# Patient Record
Sex: FEMALE | Race: WHITE | NOT HISPANIC OR LATINO | Employment: FULL TIME | URBAN - METROPOLITAN AREA
[De-identification: names, ages, dates, MRNs, and addresses within clinical notes are randomized per-mention and may not be internally consistent; named-entity substitution may affect disease eponyms.]

---

## 2017-01-02 ENCOUNTER — RESULTS ENCOUNTER (OUTPATIENT)
Dept: SURGERY | Facility: CLINIC | Age: 47
End: 2017-01-02

## 2017-01-02 DIAGNOSIS — K80.00 GALLSTONES AND INFLAMMATION OF GALLBLADDER WITHOUT OBSTRUCTION: ICD-10-CM

## 2017-01-04 ENCOUNTER — OFFICE VISIT (OUTPATIENT)
Dept: SURGERY | Facility: CLINIC | Age: 47
End: 2017-01-04

## 2017-01-04 VITALS
DIASTOLIC BLOOD PRESSURE: 88 MMHG | HEIGHT: 67 IN | WEIGHT: 228 LBS | OXYGEN SATURATION: 98 % | HEART RATE: 75 BPM | SYSTOLIC BLOOD PRESSURE: 124 MMHG | BODY MASS INDEX: 35.79 KG/M2

## 2017-01-04 DIAGNOSIS — Z48.89 ENCOUNTER FOR POSTOPERATIVE WOUND CHECK: Primary | ICD-10-CM

## 2017-01-04 PROCEDURE — 99024 POSTOP FOLLOW-UP VISIT: CPT | Performed by: SPECIALIST

## 2017-01-04 NOTE — PROGRESS NOTES
Subjective   Karen Alarcon is a 46 y.o. female.     History of Present Illness   The patient is a very pleasant 46-year-old female who returns to our office 5 days after laparoscopic cholecystectomy with cholangiogram for cholelithiasis and chronic cholecystitis.  Fortunately the patient is doing reasonably well.  She does have what appears to be moderate dermatitis around all of her incision sites.  This may well have been associated with the material utilized to hold the Steri-Strips in place.  This appears to be a minor allergic reaction.  Otherwise the patient is doing well.  She is eating with no nausea and vomiting.  There is no fever and chills.      Review of Systems   Constitutional: Negative for activity change, appetite change, chills and fever.   HENT: Negative.    Eyes: Negative.    Respiratory: Negative for cough and wheezing.    Cardiovascular: Negative.    Gastrointestinal: Negative for abdominal distention, abdominal pain, nausea and vomiting.   Endocrine: Negative.    Genitourinary: Negative for difficulty urinating, dysuria and hematuria.   Musculoskeletal: Negative.    Skin: Negative.    Allergic/Immunologic: Negative.    Neurological: Negative.    Hematological: Negative.    Psychiatric/Behavioral: Negative.        Objective   Physical Exam   Constitutional: She is oriented to person, place, and time. She appears well-developed and well-nourished.   HENT:   Head: Normocephalic.   Eyes: No scleral icterus.   Neck: No JVD present.   Cardiovascular: Normal rate and regular rhythm.    Pulmonary/Chest: Effort normal and breath sounds normal. She has no wheezes. She has no rales.   Abdominal: Soft. She exhibits no distension. There is no tenderness.   Musculoskeletal: Normal range of motion. She exhibits no edema or deformity.   Lymphadenopathy:     She has no cervical adenopathy.   Neurological: She is alert and oriented to person, place, and time.   Skin: Skin is warm and dry. Rash noted. There is  erythema.   The areas of reaction around all 4 skin incisions consistent with dermatitis   Psychiatric: She has a normal mood and affect. Her behavior is normal.   Vitals reviewed.      Assessment/Plan     The patient is generally doing well except for the above issue.  I recommended to the patient that she start Benadryl for consistently.

## 2017-01-04 NOTE — MR AVS SNAPSHOT
Karen Alarcon   2017 1:00 PM   Office Visit    Dept Phone:  [phone]   Encounter #:  [id number]    Provider:  Lázaro Leija MD   Department:  [facility] GENERAL SURGERY                Your Full Care Plan              Today's Medication Changes          These changes are accurate as of: 17  1:32 PM.  If you have any questions, ask your nurse or doctor.               Stop taking medication(s)listed here:     HYDROcodone-acetaminophen 5-325 MG per tablet   Commonly known as:  NORCO                      Your Updated Medication List          This list is accurate as of: 17  1:32 PM.  Always use your most recent med list.                clobetasol 0.05 % ointment   Commonly known as:  TEMOVATE       DIVIGEL 1 MG/GM gel   Generic drug:  estradiol       ESTRACE VAGINAL 0.1 MG/GM vaginal cream   Generic drug:  estradiol       LamoTRIgine  MG tablet sustained-release 24 hour       nitrofurantoin (macrocrystal-monohydrate) 100 MG capsule   Commonly known as:  MACROBID       valACYclovir 1000 MG tablet   Commonly known as:  VALTREX       VIIBRYD 40 MG tablet tablet   Generic drug:  vilazodone               You Were Diagnosed With        Codes Comments    Encounter for postoperative wound check    -  Primary ICD-10-CM: Z48.89  ICD-9-CM: V58.49       Instructions     None    Patient Instructions History      Upcoming Appointments     Visit Type Date Time Department    POST-OP 2017  1:00 PM [facility]      [facility] [employer] Signup     [facility] allows you to send messages to your doctor, view your test results, renew your prescriptions, schedule appointments, and more. To sign up, go to [employer] and click on the Sign Up Now link in the New User? box. Enter your [employer] Activation Code exactly as it appears below along with the last four digits of your Social Security Number and your Date of Birth () to complete the sign-up  "process. If you do not sign up before the expiration date, you must request a new code.    [employer] Activation Code: [email or web address]  Expires: 1/11/2017  2:36 PM    If you have questions, you can email [email or web address] or call [phone] to talk to our [employer] staff. Remember, [employer] is NOT to be used for urgent needs. For medical emergencies, dial 911.               Other Info from Your Visit           Allergies     No Known Allergies      Vital Signs     Blood Pressure Pulse Height Weight Oxygen Saturation Body Mass Index    124/88 75 67\" (170.2 cm) 228 lb (103 kg) 98% 35.71 kg/m2    Smoking Status                   Never Smoker           Problems and Diagnoses Noted     Encounter for postoperative wound check    -  Primary        "

## 2017-09-11 DIAGNOSIS — N39.41 URGE INCONTINENCE: ICD-10-CM

## 2017-09-11 DIAGNOSIS — M25.50 MULTIPLE JOINT PAIN: ICD-10-CM

## 2017-09-11 DIAGNOSIS — E66.9 OBESITY, CLASS II, BMI 35-39.9: Primary | ICD-10-CM

## 2017-09-11 DIAGNOSIS — Z87.898 HISTORY OF SEIZURE: ICD-10-CM

## 2017-09-11 DIAGNOSIS — R53.82 CHRONIC FATIGUE: ICD-10-CM

## 2017-09-11 DIAGNOSIS — G47.33 OSA (OBSTRUCTIVE SLEEP APNEA): ICD-10-CM

## 2017-09-11 DIAGNOSIS — I10 ESSENTIAL HYPERTENSION: ICD-10-CM

## 2017-09-11 PROBLEM — E66.812 OBESITY, CLASS II, BMI 35-39.9: Status: ACTIVE | Noted: 2017-09-11

## 2017-09-11 PROBLEM — R06.83 SNORING: Status: ACTIVE | Noted: 2017-09-11

## 2017-09-11 PROBLEM — R12 HEARTBURN: Status: ACTIVE | Noted: 2017-09-11

## 2017-09-11 PROBLEM — D58.0 HEREDITARY SPHEROCYTOSIS (HCC): Status: ACTIVE | Noted: 2017-09-11

## 2017-09-11 PROBLEM — Z86.718 HISTORY OF DVT (DEEP VEIN THROMBOSIS): Status: ACTIVE | Noted: 2017-09-11

## 2017-09-11 PROBLEM — K64.9 HEMORRHOIDS: Status: ACTIVE | Noted: 2017-09-11

## 2017-09-11 PROBLEM — F32.A DEPRESSION: Status: ACTIVE | Noted: 2017-09-11

## 2017-09-12 ENCOUNTER — LAB (OUTPATIENT)
Dept: LAB | Facility: HOSPITAL | Age: 47
End: 2017-09-12

## 2017-09-12 DIAGNOSIS — M25.50 MULTIPLE JOINT PAIN: ICD-10-CM

## 2017-09-12 DIAGNOSIS — E66.9 OBESITY, CLASS II, BMI 35-39.9: ICD-10-CM

## 2017-09-12 DIAGNOSIS — R53.82 CHRONIC FATIGUE: ICD-10-CM

## 2017-09-12 DIAGNOSIS — Z87.898 HISTORY OF SEIZURE: ICD-10-CM

## 2017-09-12 DIAGNOSIS — I10 ESSENTIAL HYPERTENSION: ICD-10-CM

## 2017-09-12 DIAGNOSIS — G47.33 OSA (OBSTRUCTIVE SLEEP APNEA): ICD-10-CM

## 2017-09-12 DIAGNOSIS — N39.41 URGE INCONTINENCE: ICD-10-CM

## 2017-09-12 LAB
ALBUMIN SERPL-MCNC: 3.6 G/DL (ref 3.5–5.2)
ALBUMIN/GLOB SERPL: 0.8 G/DL
ALP SERPL-CCNC: 103 U/L (ref 39–117)
ALT SERPL W P-5'-P-CCNC: 12 U/L (ref 1–33)
ANION GAP SERPL CALCULATED.3IONS-SCNC: 15.8 MMOL/L
AST SERPL-CCNC: 17 U/L (ref 1–32)
BASOPHILS # BLD AUTO: 0.07 10*3/MM3 (ref 0–0.2)
BASOPHILS NFR BLD AUTO: 0.4 % (ref 0–1.5)
BILIRUB SERPL-MCNC: 0.8 MG/DL (ref 0.1–1.2)
BUN BLD-MCNC: 9 MG/DL (ref 6–20)
BUN/CREAT SERPL: 9.4 (ref 7–25)
CALCIUM SPEC-SCNC: 9.4 MG/DL (ref 8.6–10.5)
CHLORIDE SERPL-SCNC: 100 MMOL/L (ref 98–107)
CHOLEST SERPL-MCNC: 190 MG/DL (ref 0–200)
CO2 SERPL-SCNC: 25.2 MMOL/L (ref 22–29)
CREAT BLD-MCNC: 0.96 MG/DL (ref 0.57–1)
DEPRECATED RDW RBC AUTO: 43.5 FL (ref 37–54)
EOSINOPHIL # BLD AUTO: 0.53 10*3/MM3 (ref 0–0.7)
EOSINOPHIL NFR BLD AUTO: 2.9 % (ref 0.3–6.2)
ERYTHROCYTE [DISTWIDTH] IN BLOOD BY AUTOMATED COUNT: 12.6 % (ref 11.7–13)
GFR SERPL CREATININE-BSD FRML MDRD: 63 ML/MIN/1.73
GLOBULIN UR ELPH-MCNC: 4.4 GM/DL
GLUCOSE BLD-MCNC: 84 MG/DL (ref 65–99)
HBA1C MFR BLD: 4.56 % (ref 4.8–5.6)
HCT VFR BLD AUTO: 44.8 % (ref 35.6–45.5)
HDLC SERPL-MCNC: 53 MG/DL (ref 40–60)
HGB BLD-MCNC: 15.5 G/DL (ref 11.9–15.5)
IMM GRANULOCYTES # BLD: 0.1 10*3/MM3 (ref 0–0.03)
IMM GRANULOCYTES NFR BLD: 0.6 % (ref 0–0.5)
LDLC SERPL CALC-MCNC: 120 MG/DL (ref 0–100)
LDLC/HDLC SERPL: 2.27 {RATIO}
LYMPHOCYTES # BLD AUTO: 5.74 10*3/MM3 (ref 0.9–4.8)
LYMPHOCYTES NFR BLD AUTO: 31.9 % (ref 19.6–45.3)
MCH RBC QN AUTO: 33 PG (ref 26.9–32)
MCHC RBC AUTO-ENTMCNC: 34.6 G/DL (ref 32.4–36.3)
MCV RBC AUTO: 95.3 FL (ref 80.5–98.2)
MONOCYTES # BLD AUTO: 2.67 10*3/MM3 (ref 0.2–1.2)
MONOCYTES NFR BLD AUTO: 14.8 % (ref 5–12)
NEUTROPHILS # BLD AUTO: 8.9 10*3/MM3 (ref 1.9–8.1)
NEUTROPHILS NFR BLD AUTO: 49.4 % (ref 42.7–76)
NRBC BLD MANUAL-RTO: 0 /100 WBC (ref 0–0)
PLATELET # BLD AUTO: 502 10*3/MM3 (ref 140–500)
PMV BLD AUTO: 11.1 FL (ref 6–12)
POTASSIUM BLD-SCNC: 4.4 MMOL/L (ref 3.5–5.2)
PROT SERPL-MCNC: 8 G/DL (ref 6–8.5)
RBC # BLD AUTO: 4.7 10*6/MM3 (ref 3.9–5.2)
SODIUM BLD-SCNC: 141 MMOL/L (ref 136–145)
TRIGL SERPL-MCNC: 84 MG/DL (ref 0–150)
TSH SERPL DL<=0.05 MIU/L-ACNC: 2.63 MIU/ML (ref 0.27–4.2)
VLDLC SERPL-MCNC: 16.8 MG/DL (ref 5–40)
WBC NRBC COR # BLD: 18.01 10*3/MM3 (ref 4.5–10.7)

## 2017-09-12 PROCEDURE — 85025 COMPLETE CBC W/AUTO DIFF WBC: CPT

## 2017-09-12 PROCEDURE — 84443 ASSAY THYROID STIM HORMONE: CPT

## 2017-09-12 PROCEDURE — 83036 HEMOGLOBIN GLYCOSYLATED A1C: CPT

## 2017-09-12 PROCEDURE — 80061 LIPID PANEL: CPT

## 2017-09-12 PROCEDURE — 80053 COMPREHEN METABOLIC PANEL: CPT

## 2017-09-12 PROCEDURE — 36415 COLL VENOUS BLD VENIPUNCTURE: CPT

## 2017-09-18 ENCOUNTER — CONSULT (OUTPATIENT)
Dept: BARIATRICS/WEIGHT MGMT | Facility: CLINIC | Age: 47
End: 2017-09-18

## 2017-09-18 VITALS
SYSTOLIC BLOOD PRESSURE: 133 MMHG | HEART RATE: 66 BPM | HEIGHT: 67 IN | TEMPERATURE: 97.7 F | RESPIRATION RATE: 16 BRPM | BODY MASS INDEX: 38.92 KG/M2 | WEIGHT: 248 LBS | DIASTOLIC BLOOD PRESSURE: 82 MMHG

## 2017-09-18 DIAGNOSIS — R12 HEARTBURN: ICD-10-CM

## 2017-09-18 DIAGNOSIS — M25.50 MULTIPLE JOINT PAIN: ICD-10-CM

## 2017-09-18 DIAGNOSIS — E66.9 OBESITY, CLASS II, BMI 35-39.9: Primary | ICD-10-CM

## 2017-09-18 DIAGNOSIS — N39.41 URGE INCONTINENCE: ICD-10-CM

## 2017-09-18 DIAGNOSIS — G47.33 OSA (OBSTRUCTIVE SLEEP APNEA): ICD-10-CM

## 2017-09-18 DIAGNOSIS — Z87.898 HISTORY OF SEIZURE: ICD-10-CM

## 2017-09-18 DIAGNOSIS — R53.82 CHRONIC FATIGUE: ICD-10-CM

## 2017-09-18 DIAGNOSIS — Z86.718 HISTORY OF DVT (DEEP VEIN THROMBOSIS): ICD-10-CM

## 2017-09-18 DIAGNOSIS — I10 ESSENTIAL HYPERTENSION: ICD-10-CM

## 2017-09-18 PROCEDURE — 99205 OFFICE O/P NEW HI 60 MIN: CPT | Performed by: NURSE PRACTITIONER

## 2017-09-18 NOTE — PROGRESS NOTES
[facility] BARIATRIC SURGERY  [address]  [facility] [address] [phone]  [address]  [facility] [address] [phone]  Dept: [phone]  9/18/2017      Karen Alarcon.  [id number]  [id number]  1970  female      Chief Complaint of weight gain; unable to maintain weight loss    History of Present Illness:   Karen is a 47 y.o. female who presents today for evaluation, education and consultation regarding weight loss surgery. The patient is interested in the sleeve gastrectomy or lapband.      Diet History:Karen has been overweight for at least 23 years, has been 35 pounds or more overweight for at least 12 years, has been 100 pounds or more overweight for 1 or more years and started dieting at age 32.  The most weight Karen lost was 20 pounds on Weight Watchers and maintained the weight loss for 1 month. Karen describes her eating habits as snacking without portion control, eating a lot of sweets, drinking up to 6 caffeine-containing drinks per day, drinking around 3 sodas per week.. Karen Alarcon has tried Weight Watchers, Dietician monitored and reduced calorie among others with success of losing up to 20 pounds, but in each instance regained the weight.    See dietician documentation for complete history.    Bariatric Surgery Evaluation: The patient is being seen for an initial visit for bariatric surgery evaluation.     Bariatric Co-morbidities:  sleep apnea, hypertension, back pain, knee pain, depression, mental health disease and urinary stress incontinence    Patient Active Problem List   Diagnosis   • Obesity, Class II, BMI 35-39.9   • Chronic fatigue   • Essential hypertension   • DESIREE (obstructive sleep apnea)   • Snoring   • Heartburn   • Hemorrhoids   • Urge incontinence   • Multiple joint pain   • History of seizure   • Depression   • Hereditary spherocytosis   • History of DVT (deep vein thrombosis)       Past  Medical History:   Diagnosis Date   • Abdominal pain    • Anxiety and depression    • Blood disorder     hereditary spherocytosis   • Bowel habit changes    • Daytime sleepiness    • Hereditary spherocytosis 9/11/2017   • Hypertension    • Itching    • Joint pain    • Seizure disorder     spherocytosis   • Sleep apnea    • Stress incontinence        Past Surgical History:   Procedure Laterality Date   • CHOLECYSTECTOMY WITH INTRAOPERATIVE CHOLANGIOGRAM N/A 12/29/2016    Procedure: CHOLECYSTECTOMY LAPAROSCOPIC INTRAOPERATIVE CHOLANGIOGRAM;  Surgeon: Lázaro Leija MD;  Location: [facility] OR;  Service:    • HYSTERECTOMY  2008    partial   • KNEE ACL RECONSTRUCTION Bilateral 2006, 2007   • SINUS SURGERY  1975   • SPLENECTOMY  1975   • TONSILLECTOMY  1978       No Known Allergies      Current Outpatient Prescriptions:   •  LamoTRIgine  MG tablet sustained-release 24 hour, Take 300 mg by mouth Every Night., Disp: , Rfl:   •  nitrofurantoin, macrocrystal-monohydrate, (MACROBID) 100 MG capsule, Take 100 mg by mouth Every Night., Disp: , Rfl:   •  valACYclovir (VALTREX) 1000 MG tablet, Take 1,000 mg by mouth Every Night., Disp: , Rfl:   •  vilazodone (VIIBRYD) 40 MG tablet tablet, Take 40 mg by mouth Every Night., Disp: , Rfl:   •  ESTRACE VAGINAL 0.1 MG/GM vaginal cream, Insert  into the vagina Daily., Disp: , Rfl:     Social History     Social History   • Marital status:      Spouse name: Ricki Alarcon   • Number of children: 2   • Years of education: N/A     Occupational History   • Teacher      Social History Main Topics   • Smoking status: Never Smoker   • Smokeless tobacco: Never Used   • Alcohol use No   • Drug use: No   • Sexual activity: Defer     Other Topics Concern   • Not on file     Social History Narrative       Family History   Problem Relation Age of Onset   • Bleeding Disorder Mother    • Cancer Father          Review of Systems:  Review of Systems   Constitutional: Positive for fatigue.    HENT: Negative.    Respiratory: Negative.    Cardiovascular: Negative.    Gastrointestinal: Negative.    Endocrine: Negative.    Genitourinary: Negative.    Musculoskeletal: Positive for back pain.   Skin: Negative.    Neurological: Negative.    Psychiatric/Behavioral: Negative.        Physical Exam:  Vital Signs:      There is no height or weight on file to calculate BMI.                Physical Exam   Constitutional: She is oriented to person, place, and time. She appears well-developed and well-nourished.   HENT:   Head: Normocephalic and atraumatic.   Eyes: Pupils are equal, round, and reactive to light.   Neck: Normal range of motion.   Cardiovascular: Normal rate, regular rhythm and normal heart sounds.    Pulmonary/Chest: Effort normal and breath sounds normal. No respiratory distress. She has no wheezes.   Abdominal: Soft. Bowel sounds are normal. She exhibits no distension. There is no tenderness.   Musculoskeletal: Normal range of motion. She exhibits no edema or deformity.   Neurological: She is alert and oriented to person, place, and time.   Skin: Skin is warm and dry.   Psychiatric: She has a normal mood and affect. Her behavior is normal.   Nursing note and vitals reviewed.         Assessment:         Karen Alarcon is a 47 y.o. year old female with medically complicated severe obesity.  , There is no height or weight on file to calculate BMI. and weight related problems including sleep apnea, hypertension, back pain, knee pain, depression, mental health disease and urinary stress incontinence.    I explained in detail the procedures that we are performing.  All of those procedures can be performed laparoscopically but there is a chance to convert to open if any technical challenges or complications do occur.  Bariatric surgery is not cosmetic surgery but rather a tool to help a patient make a life-long commitment lifestyle changes including diet, exercise, behavior changes, and taking supplemental  vitamins and minerals.    Due to the patient's BMI and co-morbidities they are at a high risk for surgery and will obtain the following:  The patient has been advised that a letter of medical support and a history and physical must be obtained from her primary care physician. A psychological evaluation will be arranged for this patient. CBC, CMP, FLP, TSH and HgbA1C will be drawn. Karen ISAAC Dorian will obtain a pre-operative CXR and EKG.     Karen Alarcon will be set up for a pre-operative diagnostic esophagogastroduodenoscopy with biopsy for evaluation. The risks and benefits of the procedure were discussed with the patient in detail and all questions were answered.  Possibility of perforation, bleeding, aspiration, anoxic brain injury, respiratory and/or cardiac arrest and death were discussed.   She received handouts regarding, all questions were answered and informed consent was obtained.     The risks, benefits, alternatives, and potential complications of all of the procedures were explained in detail including, but not limited to death, anesthesia and medication adverse effect/DVT, pulmonary embolism, trocar site/incisional hernia, wound infection, abdominal infection, bleeding, failure to lose weight or gain weight and change in body image, metabolic complications with calcium, thiamine, vitamin B12, folate, iron, and anemia.    The patient was advised to start a high protein, low fat and low carbohydrate diet. The patient was given individualized information by our dietician along with general group information and handouts.     The patient was given information regarding the RAYMOND educational video. RAYMOND is an internet based educational video which explains the surgical procedure and answers basic questions regarding the procedure. The patient was provided with instructions and a password to watch the video.    The patient was encouraged to start routine exercise including but not limited to 150 minutes  per week. The patient received a resistance band along with a handout of exercises.     The consultation plan was reviewed with the patient.    The patient understands the surgical procedures and the different surgical options that are available.  She understands the lifestyle changes that would be required after surgery and has agreed to participate in a pre-operative and postoperative weight management program.  She also expressed understanding of possible risks, had several questions answered and desires to proceed.    I think she is a good candidate for this surgery, and is interested in a sleeve gastrectomy or lapband.    Encounter Diagnoses   Name Primary?   • Obesity, Class II, BMI 35-39.9 Yes   • Essential hypertension    • DESIREE (obstructive sleep apnea)    • Heartburn    • Multiple joint pain    • Chronic fatigue    • Urge incontinence    • History of seizure    • History of DVT (deep vein thrombosis)        Plan:    Patient will have evaluations and follow up with bariatric dieticians and a psychologist before undergoing a multidisciplinary review of her candidacy.  We also discussed the weight loss requirement and rationale, and other program requirements.      Candis Chan, TERRY  9/18/2017

## 2017-09-19 NOTE — PROGRESS NOTES
"Bariatric Nutrition Counseling Interview    Patient Name:  Karen Alarcon  YOB: 1970  Age:  47 y.o.  Sex:  female  MRN: [id number]  Date:  09/19/17    Procedure Considering:  Undecided    Last Documented Height:    Ht Readings from Last 1 Encounters:   09/18/17 67\" (170.2 cm)     Last Documented Weight:   Wt Readings from Last 1 Encounters:   09/18/17 248 lb (112 kg)      Body mass index is 38.84 kg/(m^2).    Highest Weight:  243 lb.  Goal Weight: 160 lb.    History:  Past Medical History:   Diagnosis Date   • Abdominal pain    • Anxiety and depression    • Blood disorder     hereditary spherocytosis   • Bowel habit changes    • Daytime sleepiness    • Hereditary spherocytosis 9/11/2017   • Hypertension    • Itching    • Joint pain    • Seizure disorder     spherocytosis   • Sleep apnea    • Stress incontinence      Past Surgical History:   Procedure Laterality Date   • CHOLECYSTECTOMY WITH INTRAOPERATIVE CHOLANGIOGRAM N/A 12/29/2016    Procedure: CHOLECYSTECTOMY LAPAROSCOPIC INTRAOPERATIVE CHOLANGIOGRAM;  Surgeon: Lázaro Leija MD;  Location: [facility];  Service:    • HYSTERECTOMY  2008    partial   • KNEE ACL RECONSTRUCTION Bilateral 2006, 2007   • SINUS SURGERY  1975   • SPLENECTOMY  1975   • TONSILLECTOMY  1978     Family History   Problem Relation Age of Onset   • Bleeding Disorder Mother    • Cancer Father      Social History     Social History   • Marital status:      Spouse name: Ricki Alarcon   • Number of children: 2   • Years of education: N/A     Occupational History   • Teacher      Social History Main Topics   • Smoking status: Never Smoker   • Smokeless tobacco: Never Used   • Alcohol use No   • Drug use: No   • Sexual activity: Defer     Other Topics Concern   • None     Social History Narrative     Additional Health Issues to Consider:  Depression, epilepsy, Sleep Apnea,    Weight History:  Weight gain as a result of an event or condition. Altagracia Gained weight " following knee surgery, with additional weight gain over the past several years.    Previous Weight Loss Efforts:  Weight Watchers  Most Successful Weight Loss Effort:  Weight Watchers    Eating Habits: Eat out of boredom, Snacker  Eat three meals on most days?  Yes  Worst eating habit?  Snacking on high calorie foods    How often do you eat fast food? daily    Do you exercise regularly? (at least 3 times each week)  No    Occupation:  Teacher    Personal Goal After Procedure:  Karen wants to move without pain and improve her stamina.   Personal Support:      Assessment:  We reviewed program requirements for weight loss success following surgery.We discussed personal habits and lifestyle behaviors that may influence diet efforts. Program materials, including a calorie reduced diet were provided, discussed and recommended as the regimen to follow for pre and post diet planning. Karen demonstrated a good comprehension of diet requirements as well as a commitment to work on personal challenges. She will make a good candidate for weight loss surgery    Electronically signed by:  Melita Caldwell RD  09/19/17 5:51 PM

## 2017-12-20 ENCOUNTER — HOSPITAL ENCOUNTER (OUTPATIENT)
Dept: GENERAL RADIOLOGY | Facility: HOSPITAL | Age: 47
Discharge: HOME | End: 2017-12-20

## 2017-12-20 ENCOUNTER — LAB REQUISITION (OUTPATIENT)
Dept: LAB | Facility: HOSPITAL | Age: 47
End: 2017-12-20

## 2017-12-20 ENCOUNTER — OUTSIDE FACILITY SERVICE (OUTPATIENT)
Dept: BARIATRICS/WEIGHT MGMT | Facility: CLINIC | Age: 47
End: 2017-12-20

## 2017-12-20 ENCOUNTER — HOSPITAL ENCOUNTER (OUTPATIENT)
Dept: CARDIOLOGY | Facility: HOSPITAL | Age: 47
Discharge: HOME | End: 2017-12-20
Admitting: NURSE PRACTITIONER

## 2017-12-20 DIAGNOSIS — M25.50 MULTIPLE JOINT PAIN: ICD-10-CM

## 2017-12-20 DIAGNOSIS — E66.9 OBESITY, CLASS II, BMI 35-39.9: ICD-10-CM

## 2017-12-20 DIAGNOSIS — N39.41 URGE INCONTINENCE: ICD-10-CM

## 2017-12-20 DIAGNOSIS — I10 ESSENTIAL HYPERTENSION: ICD-10-CM

## 2017-12-20 DIAGNOSIS — R53.82 CHRONIC FATIGUE: ICD-10-CM

## 2017-12-20 DIAGNOSIS — Z87.898 HISTORY OF SEIZURE: ICD-10-CM

## 2017-12-20 DIAGNOSIS — G47.33 OSA (OBSTRUCTIVE SLEEP APNEA): ICD-10-CM

## 2017-12-20 DIAGNOSIS — R12 HEARTBURN: ICD-10-CM

## 2017-12-20 DIAGNOSIS — Z86.718 HISTORY OF DVT (DEEP VEIN THROMBOSIS): ICD-10-CM

## 2017-12-20 PROCEDURE — 93005 ELECTROCARDIOGRAM TRACING: CPT | Performed by: NURSE PRACTITIONER

## 2017-12-20 PROCEDURE — 93010 ELECTROCARDIOGRAM REPORT: CPT | Performed by: INTERNAL MEDICINE

## 2017-12-20 PROCEDURE — 71020 HC CHEST PA AND LATERAL: CPT

## 2017-12-20 PROCEDURE — 43239 EGD BIOPSY SINGLE/MULTIPLE: CPT | Performed by: SURGERY

## 2017-12-20 PROCEDURE — 87081 CULTURE SCREEN ONLY: CPT | Performed by: SURGERY

## 2017-12-21 LAB — UREASE TISS QL: NEGATIVE

## 2017-12-26 ENCOUNTER — TELEPHONE (OUTPATIENT)
Dept: BARIATRICS/WEIGHT MGMT | Facility: CLINIC | Age: 47
End: 2017-12-26

## 2017-12-26 NOTE — TELEPHONE ENCOUNTER
----- Message from Mick Posadas Jr., MD sent at 12/26/2017  6:58 AM EST -----  Please call the patient regarding her abnormal result and if abnormal treat per protocol. Make sure she has follow up appointment.

## 2018-01-02 ENCOUNTER — APPOINTMENT (OUTPATIENT)
Dept: WOMENS IMAGING | Facility: HOSPITAL | Age: 48
End: 2018-01-02

## 2018-01-02 PROCEDURE — 77063 BREAST TOMOSYNTHESIS BI: CPT | Performed by: RADIOLOGY

## 2018-01-02 PROCEDURE — 77067 SCR MAMMO BI INCL CAD: CPT | Performed by: RADIOLOGY

## 2018-04-24 ENCOUNTER — PREP FOR SURGERY (OUTPATIENT)
Dept: OTHER | Facility: HOSPITAL | Age: 48
End: 2018-04-24

## 2018-04-24 DIAGNOSIS — E66.01 SEVERE OBESITY (BMI 35.0-39.9): Primary | ICD-10-CM

## 2018-04-24 RX ORDER — SCOLOPAMINE TRANSDERMAL SYSTEM 1 MG/1
1 PATCH, EXTENDED RELEASE TRANSDERMAL ONCE
Status: CANCELLED | OUTPATIENT
Start: 2018-06-11 | End: 2018-06-11

## 2018-04-24 RX ORDER — SODIUM CHLORIDE 0.9 % (FLUSH) 0.9 %
1-10 SYRINGE (ML) INJECTION AS NEEDED
Status: CANCELLED | OUTPATIENT
Start: 2018-06-11

## 2018-04-24 RX ORDER — ACETAMINOPHEN 160 MG/5ML
975 SOLUTION ORAL ONCE
Status: CANCELLED | OUTPATIENT
Start: 2018-06-11 | End: 2018-06-11

## 2018-04-24 RX ORDER — CEFAZOLIN SODIUM IN 0.9 % NACL 3 G/100 ML
3 INTRAVENOUS SOLUTION, PIGGYBACK (ML) INTRAVENOUS
Status: CANCELLED | OUTPATIENT
Start: 2018-06-11

## 2018-04-24 RX ORDER — CHLORHEXIDINE GLUCONATE 0.12 MG/ML
15 RINSE ORAL SEE ADMIN INSTRUCTIONS
Status: CANCELLED | OUTPATIENT
Start: 2018-06-11

## 2018-04-24 RX ORDER — METOCLOPRAMIDE HYDROCHLORIDE 5 MG/ML
10 INJECTION INTRAMUSCULAR; INTRAVENOUS ONCE
Status: CANCELLED | OUTPATIENT
Start: 2018-06-11 | End: 2018-06-11

## 2018-04-24 RX ORDER — SODIUM CHLORIDE, SODIUM LACTATE, POTASSIUM CHLORIDE, CALCIUM CHLORIDE 600; 310; 30; 20 MG/100ML; MG/100ML; MG/100ML; MG/100ML
100 INJECTION, SOLUTION INTRAVENOUS CONTINUOUS
Status: CANCELLED | OUTPATIENT
Start: 2018-06-11

## 2018-04-24 RX ORDER — FAMOTIDINE 10 MG/ML
20 INJECTION, SOLUTION INTRAVENOUS ONCE
Status: CANCELLED | OUTPATIENT
Start: 2018-06-11 | End: 2018-06-11

## 2018-05-16 PROBLEM — E66.01 SEVERE OBESITY (BMI 35.0-39.9): Status: ACTIVE | Noted: 2018-05-16

## 2018-05-17 ENCOUNTER — CONSULT (OUTPATIENT)
Dept: BARIATRICS/WEIGHT MGMT | Facility: CLINIC | Age: 48
End: 2018-05-17

## 2018-05-17 VITALS
DIASTOLIC BLOOD PRESSURE: 88 MMHG | BODY MASS INDEX: 37.98 KG/M2 | RESPIRATION RATE: 16 BRPM | HEART RATE: 77 BPM | HEIGHT: 67 IN | SYSTOLIC BLOOD PRESSURE: 133 MMHG | WEIGHT: 242 LBS

## 2018-05-17 DIAGNOSIS — M25.50 MULTIPLE JOINT PAIN: ICD-10-CM

## 2018-05-17 DIAGNOSIS — I10 ESSENTIAL HYPERTENSION: ICD-10-CM

## 2018-05-17 DIAGNOSIS — Z86.718 HISTORY OF DVT (DEEP VEIN THROMBOSIS): ICD-10-CM

## 2018-05-17 DIAGNOSIS — F32.A DEPRESSION, UNSPECIFIED DEPRESSION TYPE: ICD-10-CM

## 2018-05-17 DIAGNOSIS — G47.33 OSA (OBSTRUCTIVE SLEEP APNEA): ICD-10-CM

## 2018-05-17 DIAGNOSIS — R12 HEARTBURN: ICD-10-CM

## 2018-05-17 DIAGNOSIS — Z90.81 HISTORY OF SPLENECTOMY: ICD-10-CM

## 2018-05-17 DIAGNOSIS — N39.41 URGE INCONTINENCE: ICD-10-CM

## 2018-05-17 DIAGNOSIS — D58.0 HEREDITARY SPHEROCYTOSIS (HCC): ICD-10-CM

## 2018-05-17 DIAGNOSIS — R53.82 CHRONIC FATIGUE: ICD-10-CM

## 2018-05-17 DIAGNOSIS — E66.9 OBESITY, CLASS II, BMI 35-39.9: Primary | ICD-10-CM

## 2018-05-17 PROBLEM — E66.01 SEVERE OBESITY (BMI 35.0-39.9): Status: RESOLVED | Noted: 2018-05-16 | Resolved: 2018-05-17

## 2018-05-17 PROCEDURE — 99215 OFFICE O/P EST HI 40 MIN: CPT | Performed by: SURGERY

## 2018-05-17 RX ORDER — FOLIC ACID 1 MG/1
1 TABLET ORAL DAILY
Qty: 40 TABLET | Refills: 0 | Status: SHIPPED | OUTPATIENT
Start: 2018-05-17 | End: 2018-07-09

## 2018-05-17 NOTE — H&P
Bariatric Consult:  Referred by Anibal Callejas MD    Karen Alarcon is here today for consult on Consult (Consult GS)      History of Present Illness:     Karen Alarcon is a 47 y.o. female with morbid obesity with co-morbidities including sleep apnea, hypertension, osteoarthritis, previous DVT and depression who presents for surgical consultation for the above procedure. Karen has completed the initial intake visit and has been examined by our nurse practitioner, dietician, psychologist and underwent the extensive educational teaching process under the guidance of our bariatric coordinator and myself. Karen also has seen the educational video RAYMOND on the surgical procedure if available. Karen attended today more educational teaching from our bariatric coordinator and myself. Karen has had an extensive medical workup including a visit with their primary care physician, EKG, chest radiograph, blood work, EGD or UGI and possibly further testing. These have been reviewed by me and discussed with the patient. Karen is now ready to proceed with surgery. Karen presently denies nausea, vomiting, fever, chills, chest pain, shortness of air, melena, hematochezia, hemetemesis, dysuria, frequency, hematuria, jaundice or abdominal pain.       Past Medical History:   Diagnosis Date   • Abdominal pain    • Anxiety and depression    • Blood disorder     hereditary spherocytosis   • Bowel habit changes    • Daytime sleepiness    • Hereditary spherocytosis 9/11/2017   • Hypertension    • Itching    • Joint pain    • Seizure disorder     spherocytosis   • Sleep apnea    • Stress incontinence        Encounter Diagnoses   Name Primary?   • Obesity, Class II, BMI 35-39.9 Yes   • Chronic fatigue    • Essential hypertension    • DESIREE (obstructive sleep apnea)    • Heartburn    • Urge incontinence    • Multiple joint pain    • Depression, unspecified depression type    • History of DVT (deep vein thrombosis)    • Hereditary  spherocytosis    • History of splenectomy        Past Surgical History:   Procedure Laterality Date   • CHOLECYSTECTOMY WITH INTRAOPERATIVE CHOLANGIOGRAM N/A 12/29/2016    Procedure: CHOLECYSTECTOMY LAPAROSCOPIC INTRAOPERATIVE CHOLANGIOGRAM;  Surgeon: Lázaro Leija MD;  Location: [facility] OR;  Service:    • HYSTERECTOMY  2008    partial   • KNEE ACL RECONSTRUCTION Bilateral 2006, 2007   • SINUS SURGERY  1975   • SPLENECTOMY  1975   • TONSILLECTOMY  1978       Patient Active Problem List   Diagnosis   • Obesity, Class II, BMI 35-39.9   • Chronic fatigue   • Essential hypertension   • DESIREE (obstructive sleep apnea)   • Snoring   • Heartburn   • Hemorrhoids   • Urge incontinence   • Multiple joint pain   • History of seizure   • Depression   • Hereditary spherocytosis   • History of DVT (deep vein thrombosis)   • History of splenectomy       No Known Allergies      Current Outpatient Prescriptions:   •  LamoTRIgine  MG tablet sustained-release 24 hour, Take 300 mg by mouth Every Night., Disp: , Rfl:   •  nitrofurantoin, macrocrystal-monohydrate, (MACROBID) 100 MG capsule, Take 100 mg by mouth Every Night., Disp: , Rfl:   •  valACYclovir (VALTREX) 1000 MG tablet, Take 1,000 mg by mouth Every Night., Disp: , Rfl:   •  vilazodone (VIIBRYD) 40 MG tablet tablet, Take 40 mg by mouth Every Night., Disp: , Rfl:   •  folic acid (FOLVITE) 1 MG tablet, Take 1 tablet by mouth Daily. Begin two weeks prior to surgery, Disp: 40 tablet, Rfl: 0    Social History     Social History   • Marital status:      Spouse name: Ricki Alarcon   • Number of children: 2   • Years of education: N/A     Occupational History   • Teacher      Social History Main Topics   • Smoking status: Never Smoker   • Smokeless tobacco: Never Used   • Alcohol use No   • Drug use: No   • Sexual activity: Defer     Other Topics Concern   • Not on file     Social History Narrative   • No narrative on file       Family History   Problem Relation Age  of Onset   • Bleeding Disorder Mother    • Cancer Father        Review of Systems:  Review of Systems   Constitutional: Positive for fatigue.   Gastrointestinal: Positive for diarrhea and nausea.   Musculoskeletal: Positive for arthralgias.   All other systems reviewed and are negative.        Physical Exam:    Vital Signs:  Weight: 110 kg (242 lb)   Body mass index is 37.89 kg/m².      Heart Rate: 77   BP: 133/88       Physical Exam   Constitutional: She is oriented to person, place, and time. She appears well-nourished.   HENT:   Head: Normocephalic and atraumatic.   Mouth/Throat: Oropharynx is clear and moist.   Eyes: Conjunctivae and EOM are normal. Pupils are equal, round, and reactive to light. No scleral icterus.   Neck: Normal range of motion. Neck supple. No thyromegaly present.   Cardiovascular: Normal rate and regular rhythm.    Pulmonary/Chest: Effort normal and breath sounds normal.   Abdominal: Soft. Bowel sounds are normal. She exhibits no distension and no mass. There is no tenderness. There is no rebound and no guarding. No hernia.   Musculoskeletal: Normal range of motion.   Lymphadenopathy:     She has no cervical adenopathy.   Neurological: She is alert and oriented to person, place, and time. No cranial nerve deficit. Coordination normal.   Skin: Skin is warm and dry. No erythema.   Psychiatric: She has a normal mood and affect. Her behavior is normal.   Vitals reviewed.        Assessment:    Karen Alarcon is a 47 y.o. year old female with medically complicated severe obesity with a BMI of Body mass index is 37.89 kg/m². and multiple co-morbidities listed in the encounter diagnosis.    I think she is an appropriate candidate for this surgery, and is ready to proceed.      Plan/Discussion/Summary:  No hiatal hernia per me.  No PPI.  Helical back to pylori negative.  Patient status post open splenectomy secondary to hereditary spherocytosis.    The patient has returned to the office for a  surgical consultation and has requested to proceed with a laparoscopic gastric sleeve.  I have had the opportunity to obtain a history, examine the patient and review the patient's chart.    The patient understands that surgery is a tool and that weight loss is not guaranteed but only seen in the context of appropriate use, regular follow up, exercise and making appropriate food choices.     I personally discussed the potential complications of the laparoscopic gastric sleeve with this patient.  The patient is well aware of potential complications of the surgery that include but not limited to bleeding, infections, deep vein thrombosis, pulmonary embolism, pulmonary complications such as pneumonia, cardiac event, hernias, small bowel obstruction, damage to the spleen or other organs, bowel injury, disfiguring scars, failure to lose weight, need for additional surgery, conversion to an open procedure and death.  The patient is also aware of complications which apply in particular to the gastric sleeve and can include but not limited to the leakage of gastric contents at the staple line, the development of an intra-abdominal abscess, gastroesophageal reflux disease, Capps's esophagus, ulcers, vitamin/mineral deficiencies, strictures, and the possibility of converting this procedure to a Umu-en-Y gastric bypass. The patient also understands the possibility of requiring an acid reducer medication for the rest of their life.    The risks, benefits, potential complications and alternative therapies were discussed at great length as outlined in our extensive consent forms, online consent and educational teaching processes.    The patient has confirmed the participation in the programs extensive educational activities.    All questions and concerns were answered to patient's satisfaction.  The patient now wishes to proceed with surgery.    Patient has declined the pre-operative insertion of an IVC filter.     The patient  has agreed to a postoperative course of anitcoagulant therapy.        I instructed patient to start on a H2 blocker or proton pump inhibitor if not already on one of these medications.    I explained in detail the procedures that we perform.  All of these procedures have a chance to convert to open if any technical challenges or complications do occur.  Bariatric surgery is not cosmetic surgery but rather a tool to help a patient make a life-long commitment lifestyle change including diet, exercise, behavior changes, and taking supplemental vitamins and minerals.    Problems after surgery may require more operations to correct them.    The risks, benefits, alternatives, and potential complications of all of the procedures were explained in detail including, but not limited to death, anesthesia and medication adverse effect, deep venous thrombosis, pulmonary embolism, trocar site/incisional hernia, wound infection, abdominal infection, bleeding, failure to lose weight, gain weight, a change in body image, metabolic complications with vitamin deficiences and anemia.    Weight loss expectations were discussed with the patient in detail. The weight loss operations most commonly performed are the sleeve gastrectomy and the Umu-en-Y gastric bypass. These operations result in weight losses up to approximately 25-35% of initial body weight 12 to 24 months after surgery with the gastric bypass usually the higher percent of weight loss but depends on patient using the tool.    For the gastric bypass and loop duodenal switch (ZOE-S) the risks include but not limited to the following early complications:  Anastomotic leak/peritonitis, Umu/Alimentary/biliopancreatic limb obstruction, severe & minor wound infection/seroma, and nausea/vomiting.  Late complications can include but are not limited to malnutrition, vitamin deficiencies, frequent loose stools,  stomal stenosis, marginal ulcer, bowel obstruction, intussusception,  internal, and incisional hernia.    Regarding the gastric sleeve, there is less long-term outcome data and higher risk of dysphagia and reflux compared to a gastric bypass, as well as risk of internal visceral/organ injury, splenectomy, bleeding, infection, leak (which could require further intervention possible conversion to Umu-en-Y gastric bypass), stenosis and possibility of regaining weight.    Karen was counseled regarding diagnostic results, instructions for management, risk factor reductions, prognosis, patient and family education, impressions, risks and benefits of treatment options and importance of compliance with treatment. Total face to face time of the encounter was over 45 minutes and over 30 minutes was spent counseling.     Jt Report   As part of this patient's treatment plan I am prescribing controlled substances. The patient has been made aware of appropriate use of such medications, including potential risk of somnolence, limited ability to drive and /or work safely, and potential for dependence or overdose. It has also been made clear that these medications are for use by this patient only, without concomitant use of alcohol or other substances unless prescribed.    Karen has completed prescribing agreement detailing terms of continued prescribing of controlled substances, including monitoring JT reports, urine drug screening, and pill counts if necessary. Karen is aware that inappropriate use will result in cessation of prescribing such medications.    JT report has been reviewed      History and physical exam exhibit continued safe and appropriate use of controlled substances.      Karen understands the surgical procedures and the different surgical options that are available.  She understands the lifestyle changes that are required after surgery and has agreed to follow the guidelines outlined in the weight management program.  She also expressed understanding of the risks  involved and had all of female questions answered and desires to proceed.      Mick Posadas MD  5/17/2018

## 2018-05-17 NOTE — PATIENT INSTRUCTIONS
Bariatric Manual    You were provided a manual specific to the procedure that you have chosen.  Please refer to that with any questions or call the office at [phone]

## 2018-05-30 ENCOUNTER — APPOINTMENT (OUTPATIENT)
Dept: PREADMISSION TESTING | Facility: HOSPITAL | Age: 48
End: 2018-05-30

## 2018-06-07 ENCOUNTER — APPOINTMENT (OUTPATIENT)
Dept: PREADMISSION TESTING | Facility: HOSPITAL | Age: 48
End: 2018-06-07

## 2018-06-07 DIAGNOSIS — E66.01 SEVERE OBESITY (BMI 35.0-39.9): ICD-10-CM

## 2018-06-07 LAB
ALBUMIN SERPL-MCNC: 4.1 G/DL (ref 3.5–5.2)
ALBUMIN/GLOB SERPL: 1 G/DL
ALP SERPL-CCNC: 87 U/L (ref 39–117)
ALT SERPL W P-5'-P-CCNC: 20 U/L (ref 1–33)
ANION GAP SERPL CALCULATED.3IONS-SCNC: 13.5 MMOL/L
AST SERPL-CCNC: 22 U/L (ref 1–32)
BILIRUB SERPL-MCNC: 0.5 MG/DL (ref 0.1–1.2)
BUN BLD-MCNC: 17 MG/DL (ref 6–20)
BUN/CREAT SERPL: 15 (ref 7–25)
CALCIUM SPEC-SCNC: 9.4 MG/DL (ref 8.6–10.5)
CHLORIDE SERPL-SCNC: 100 MMOL/L (ref 98–107)
CO2 SERPL-SCNC: 25.5 MMOL/L (ref 22–29)
CREAT BLD-MCNC: 1.13 MG/DL (ref 0.57–1)
DEPRECATED RDW RBC AUTO: 42.8 FL (ref 37–54)
ERYTHROCYTE [DISTWIDTH] IN BLOOD BY AUTOMATED COUNT: 12.7 % (ref 11.7–13)
GFR SERPL CREATININE-BSD FRML MDRD: 52 ML/MIN/1.73
GLOBULIN UR ELPH-MCNC: 4.2 GM/DL
GLUCOSE BLD-MCNC: 85 MG/DL (ref 65–99)
HCT VFR BLD AUTO: 43.2 % (ref 35.6–45.5)
HGB BLD-MCNC: 15.2 G/DL (ref 11.9–15.5)
MCH RBC QN AUTO: 33.1 PG (ref 26.9–32)
MCHC RBC AUTO-ENTMCNC: 35.2 G/DL (ref 32.4–36.3)
MCV RBC AUTO: 94.1 FL (ref 80.5–98.2)
PLATELET # BLD AUTO: 498 10*3/MM3 (ref 140–500)
PMV BLD AUTO: 11.7 FL (ref 6–12)
POTASSIUM BLD-SCNC: 3.9 MMOL/L (ref 3.5–5.2)
PROT SERPL-MCNC: 8.3 G/DL (ref 6–8.5)
RBC # BLD AUTO: 4.59 10*6/MM3 (ref 3.9–5.2)
SODIUM BLD-SCNC: 139 MMOL/L (ref 136–145)
WBC NRBC COR # BLD: 18.26 10*3/MM3 (ref 4.5–10.7)

## 2018-06-07 PROCEDURE — 93005 ELECTROCARDIOGRAM TRACING: CPT

## 2018-06-07 PROCEDURE — 80053 COMPREHEN METABOLIC PANEL: CPT | Performed by: SURGERY

## 2018-06-07 PROCEDURE — 93010 ELECTROCARDIOGRAM REPORT: CPT | Performed by: INTERNAL MEDICINE

## 2018-06-07 PROCEDURE — 85027 COMPLETE CBC AUTOMATED: CPT | Performed by: SURGERY

## 2018-06-07 PROCEDURE — 36415 COLL VENOUS BLD VENIPUNCTURE: CPT

## 2018-06-07 RX ORDER — CHLORHEXIDINE GLUCONATE 500 MG/1
CLOTH TOPICAL
Status: ON HOLD | COMMUNITY
End: 2018-06-12

## 2018-06-07 RX ORDER — ESTRADIOL 1 MG/G
1 GEL TOPICAL DAILY
COMMUNITY
End: 2018-06-13 | Stop reason: HOSPADM

## 2018-06-07 RX ORDER — LANSOPRAZOLE 15 MG/1
15 CAPSULE, DELAYED RELEASE ORAL NIGHTLY
COMMUNITY
End: 2018-09-14

## 2018-06-07 NOTE — DISCHARGE INSTRUCTIONS
Take the following medications the morning of surgery with a small sip of water:    NONE    ARRIVE AT 6:00    General Instructions:   • You may have up to 20 oz of clear-artificially sweetened liquid (to include Powerade Zero, Water, Tea/Coffee with no cream or milk added).  Nothing red in color.  Drink must be completed 4 hours before the start of your surgery- nothing to drink within 4 hours of surgery.    • Patients who avoid smoking, chewing tobacco and alcohol for 4 weeks prior to surgery have a reduced risk of post-operative complications.  Quit smoking as many days before surgery as you can.  • Do not smoke, use chewing tobacco or drink alcohol the day of surgery.   • If applicable bring your C-PAP/ BI-PAP machine.  • Bring any papers given to you in the doctor’s office.  • Wear clean comfortable clothes and socks.  • Do not wear contact lenses or make-up.  Bring a case for your glasses.   • Bring crutches or walker if applicable.  • Remove all piercings.  Leave jewelry and any other valuables at home.  • Hair extensions with metal clips must be removed prior to surgery.  • The Pre-Admission Testing nurse will instruct you to bring medications if unable to obtain an accurate list in Pre-Admission Testing.        If you were given a blood bank ID arm band remember to bring it with you the day of surgery.    Preventing a Surgical Site Infection:  • For 2 to 3 days before surgery, avoid shaving with a razor because the razor can irritate skin and make it easier to develop an infection. Take a regular shower using a fresh bar of anti-bacterial soap (such as Dial) and clean washcloth followed by Hibiclens.  • The day before surgery take a regular shower using a fresh bar of anti-bacterial soap (such as Dial) and clean washcloth followed by Hibiclens.  Then utilize one of the cloths given to you in PAT.    • The night prior to surgery sleep in a clean bed with clean clothing.  Do not allow pets to sleep with  you.  • The morning of surgery take a shower, rinse with Hibiclens and then utilize the 2nd cloth given to you in PAT.  Dry with a clean towel and dress in clean clothing.  • Do not use any cologne, deodorant or powder morning of surgery.    • Ask your surgeon if you will be receiving antibiotics prior to surgery.  • Make sure you, your family, and all healthcare providers clean their hands with soap and water or an alcohol based hand  before caring for you or your wound.    Day of surgery:  Upon arrival, a Pre-op nurse and Anesthesiologist will review your health history, obtain vital signs, and answer questions you may have.  The only belongings needed at this time will be your home medications and if applicable your C-PAP/BI-PAP machine.  If you are staying overnight your family can leave the rest of your belongings in the car and bring them to your room later.  A Pre-op nurse will start an IV and you may receive medication in preparation for surgery, including something to help you relax.  Your family will be able to see you in the Pre-op area.  While you are in surgery your family should notify the waiting room  if they leave the waiting room area and provide a contact phone number.    Please be aware that surgery does come with discomfort.  We want to make every effort to control your discomfort so please discuss any uncontrolled symptoms with your nurse.   Your doctor will most likely have prescribed pain medications.      If you are going home after surgery you will receive individualized written care instructions before being discharged.  A responsible adult must drive you to and from the hospital on the day of your surgery and stay with you for 24 hours.    If you are staying overnight following surgery, you will be transported to your hospital room following the recovery period.  [facility] has all private rooms.    If you have any questions please call Pre-Admission  Testing at [phone].  Deductibles and co-payments are collected on the day of service. Please be prepared to pay the required co-pay, deductible or deposit on the day of service as defined by your plan.

## 2018-06-11 ENCOUNTER — ANESTHESIA EVENT (OUTPATIENT)
Dept: PERIOP | Facility: HOSPITAL | Age: 48
End: 2018-06-11

## 2018-06-11 ENCOUNTER — HOSPITAL ENCOUNTER (INPATIENT)
Facility: HOSPITAL | Age: 48
LOS: 2 days | Discharge: HOME | End: 2018-06-13
Attending: SURGERY | Admitting: SURGERY

## 2018-06-11 ENCOUNTER — ANESTHESIA (OUTPATIENT)
Dept: PERIOP | Facility: HOSPITAL | Age: 48
End: 2018-06-11

## 2018-06-11 DIAGNOSIS — E66.9 OBESITY, CLASS II, BMI 35-39.9: Primary | ICD-10-CM

## 2018-06-11 DIAGNOSIS — E66.01 SEVERE OBESITY (BMI 35.0-39.9): ICD-10-CM

## 2018-06-11 LAB
ANION GAP SERPL CALCULATED.3IONS-SCNC: 11.7 MMOL/L
BASOPHILS # BLD AUTO: 0.06 10*3/MM3 (ref 0–0.2)
BASOPHILS NFR BLD AUTO: 0.4 % (ref 0–1.5)
BUN BLD-MCNC: 13 MG/DL (ref 6–20)
BUN/CREAT SERPL: 11.6 (ref 7–25)
CALCIUM SPEC-SCNC: 9.4 MG/DL (ref 8.6–10.5)
CHLORIDE SERPL-SCNC: 103 MMOL/L (ref 98–107)
CO2 SERPL-SCNC: 28.3 MMOL/L (ref 22–29)
CREAT BLD-MCNC: 1.12 MG/DL (ref 0.57–1)
DEPRECATED RDW RBC AUTO: 44.4 FL (ref 37–54)
EOSINOPHIL # BLD AUTO: 0.57 10*3/MM3 (ref 0–0.7)
EOSINOPHIL NFR BLD AUTO: 3.5 % (ref 0.3–6.2)
ERYTHROCYTE [DISTWIDTH] IN BLOOD BY AUTOMATED COUNT: 13 % (ref 11.7–13)
GFR SERPL CREATININE-BSD FRML MDRD: 52 ML/MIN/1.73
GLUCOSE BLD-MCNC: 87 MG/DL (ref 65–99)
HCT VFR BLD AUTO: 45.2 % (ref 35.6–45.5)
HGB BLD-MCNC: 15.6 G/DL (ref 11.9–15.5)
IMM GRANULOCYTES # BLD: 0.04 10*3/MM3 (ref 0–0.03)
IMM GRANULOCYTES NFR BLD: 0.2 % (ref 0–0.5)
LYMPHOCYTES # BLD AUTO: 4.89 10*3/MM3 (ref 0.9–4.8)
LYMPHOCYTES NFR BLD AUTO: 30.4 % (ref 19.6–45.3)
MCH RBC QN AUTO: 32.6 PG (ref 26.9–32)
MCHC RBC AUTO-ENTMCNC: 34.5 G/DL (ref 32.4–36.3)
MCV RBC AUTO: 94.6 FL (ref 80.5–98.2)
MONOCYTES # BLD AUTO: 2.03 10*3/MM3 (ref 0.2–1.2)
MONOCYTES NFR BLD AUTO: 12.6 % (ref 5–12)
NEUTROPHILS # BLD AUTO: 8.48 10*3/MM3 (ref 1.9–8.1)
NEUTROPHILS NFR BLD AUTO: 52.9 % (ref 42.7–76)
NRBC BLD MANUAL-RTO: 0 /100 WBC (ref 0–0)
PLATELET # BLD AUTO: 485 10*3/MM3 (ref 140–500)
PMV BLD AUTO: 11.5 FL (ref 6–12)
POTASSIUM BLD-SCNC: 4.3 MMOL/L (ref 3.5–5.2)
RBC # BLD AUTO: 4.78 10*6/MM3 (ref 3.9–5.2)
SODIUM BLD-SCNC: 143 MMOL/L (ref 136–145)
WBC NRBC COR # BLD: 16.07 10*3/MM3 (ref 4.5–10.7)

## 2018-06-11 PROCEDURE — 94799 UNLISTED PULMONARY SVC/PX: CPT

## 2018-06-11 PROCEDURE — 25010000002 METOCLOPRAMIDE PER 10 MG: Performed by: SURGERY

## 2018-06-11 PROCEDURE — 25010000003 CEFAZOLIN IN DEXTROSE 2-4 GM/100ML-% SOLUTION: Performed by: NURSE ANESTHETIST, CERTIFIED REGISTERED

## 2018-06-11 PROCEDURE — 25010000002 ONDANSETRON PER 1 MG: Performed by: SURGERY

## 2018-06-11 PROCEDURE — 25010000002 ONDANSETRON PER 1 MG: Performed by: NURSE ANESTHETIST, CERTIFIED REGISTERED

## 2018-06-11 PROCEDURE — 85025 COMPLETE CBC W/AUTO DIFF WBC: CPT | Performed by: SURGERY

## 2018-06-11 PROCEDURE — 43775 LAP SLEEVE GASTRECTOMY: CPT | Performed by: NURSE PRACTITIONER

## 2018-06-11 PROCEDURE — 25010000002 PHENYLEPHRINE PER 1 ML: Performed by: NURSE ANESTHETIST, CERTIFIED REGISTERED

## 2018-06-11 PROCEDURE — 25010000002 PROMETHAZINE PER 50 MG: Performed by: SURGERY

## 2018-06-11 PROCEDURE — 88307 TISSUE EXAM BY PATHOLOGIST: CPT | Performed by: SURGERY

## 2018-06-11 PROCEDURE — 25010000002 HYDROMORPHONE HCL PF 500 MG/50ML SOLUTION: Performed by: SURGERY

## 2018-06-11 PROCEDURE — 25010000002 ENOXAPARIN PER 10 MG: Performed by: SURGERY

## 2018-06-11 PROCEDURE — 25010000002 FENTANYL CITRATE (PF) 100 MCG/2ML SOLUTION: Performed by: ANESTHESIOLOGY

## 2018-06-11 PROCEDURE — 25010000002 DEXAMETHASONE PER 1 MG: Performed by: NURSE ANESTHETIST, CERTIFIED REGISTERED

## 2018-06-11 PROCEDURE — 25010000002 CEFAZOLIN PER 500 MG: Performed by: SURGERY

## 2018-06-11 PROCEDURE — 80048 BASIC METABOLIC PNL TOTAL CA: CPT | Performed by: SURGERY

## 2018-06-11 PROCEDURE — 25010000002 MIDAZOLAM PER 1 MG: Performed by: ANESTHESIOLOGY

## 2018-06-11 PROCEDURE — 25010000002 PROPOFOL 10 MG/ML EMULSION: Performed by: NURSE ANESTHETIST, CERTIFIED REGISTERED

## 2018-06-11 PROCEDURE — 94640 AIRWAY INHALATION TREATMENT: CPT

## 2018-06-11 PROCEDURE — 25010000002 HYDROMORPHONE PER 4 MG: Performed by: ANESTHESIOLOGY

## 2018-06-11 PROCEDURE — 0DB64Z3 EXCISION OF STOMACH, PERCUTANEOUS ENDOSCOPIC APPROACH, VERTICAL: ICD-10-PCS | Performed by: SURGERY

## 2018-06-11 PROCEDURE — 43775 LAP SLEEVE GASTRECTOMY: CPT | Performed by: SURGERY

## 2018-06-11 DEVICE — PERI-STRIPS DRY WITH VERITAS COLLAGEN MATRIX (PSD-V) IS PREPARED FROM DEHYDRATED BOVINE PERICARDIUM PROCURED FROM CATTLE UNDER 30 MONTHS OF AGE IN THE UNITED STATES. ONE (1) TUBE OF PSD GEL (GEL) IS PROVIDED FOR EVERY TWO (2) POUCHES OF PSD-V. THE GEL IS USED TO CREATE A TEMPORARY BOND BETWEEN THE PSD-V BUTTRESS AND THE SURGICAL STAPLER JAWS UNTIL THE STAPLER IS POSITIONED AND FIRED.
Type: IMPLANTABLE DEVICE | Site: STOMACH | Status: FUNCTIONAL
Brand: PERI-STRIPS DRY WITH VERITAS COLLAGEN MATRIX

## 2018-06-11 DEVICE — SEALANT FIBRIN TISSEEL FZ 4ML: Type: IMPLANTABLE DEVICE | Site: STOMACH | Status: FUNCTIONAL

## 2018-06-11 RX ORDER — HYDROCODONE BITARTRATE AND ACETAMINOPHEN 7.5; 325 MG/1; MG/1
1 TABLET ORAL ONCE AS NEEDED
Status: DISCONTINUED | OUTPATIENT
Start: 2018-06-11 | End: 2018-06-11 | Stop reason: HOSPADM

## 2018-06-11 RX ORDER — PROMETHAZINE HYDROCHLORIDE 25 MG/ML
12.5 INJECTION, SOLUTION INTRAMUSCULAR; INTRAVENOUS EVERY 4 HOURS PRN
Status: DISCONTINUED | OUTPATIENT
Start: 2018-06-11 | End: 2018-06-13 | Stop reason: HOSPADM

## 2018-06-11 RX ORDER — ATROPINE SULFATE 0.4 MG/ML
AMPUL (ML) INJECTION AS NEEDED
Status: DISCONTINUED | OUTPATIENT
Start: 2018-06-11 | End: 2018-06-11 | Stop reason: SURG

## 2018-06-11 RX ORDER — GLYCOPYRROLATE 0.2 MG/ML
INJECTION INTRAMUSCULAR; INTRAVENOUS AS NEEDED
Status: DISCONTINUED | OUTPATIENT
Start: 2018-06-11 | End: 2018-06-11 | Stop reason: SURG

## 2018-06-11 RX ORDER — ONDANSETRON 4 MG/1
4 TABLET, ORALLY DISINTEGRATING ORAL EVERY 4 HOURS PRN
Status: DISCONTINUED | OUTPATIENT
Start: 2018-06-11 | End: 2018-06-13 | Stop reason: HOSPADM

## 2018-06-11 RX ORDER — FENTANYL CITRATE 50 UG/ML
50 INJECTION, SOLUTION INTRAMUSCULAR; INTRAVENOUS
Status: DISCONTINUED | OUTPATIENT
Start: 2018-06-11 | End: 2018-06-11 | Stop reason: HOSPADM

## 2018-06-11 RX ORDER — METOCLOPRAMIDE HYDROCHLORIDE 5 MG/ML
10 INJECTION INTRAMUSCULAR; INTRAVENOUS EVERY 6 HOURS
Status: DISCONTINUED | OUTPATIENT
Start: 2018-06-11 | End: 2018-06-13 | Stop reason: HOSPADM

## 2018-06-11 RX ORDER — PROMETHAZINE HYDROCHLORIDE 25 MG/ML
12.5 INJECTION, SOLUTION INTRAMUSCULAR; INTRAVENOUS ONCE AS NEEDED
Status: DISCONTINUED | OUTPATIENT
Start: 2018-06-11 | End: 2018-06-11 | Stop reason: HOSPADM

## 2018-06-11 RX ORDER — NITROGLYCERIN 0.4 MG/1
0.4 TABLET SUBLINGUAL
Status: DISCONTINUED | OUTPATIENT
Start: 2018-06-11 | End: 2018-06-13 | Stop reason: HOSPADM

## 2018-06-11 RX ORDER — SODIUM CHLORIDE 0.9 % (FLUSH) 0.9 %
1-10 SYRINGE (ML) INJECTION AS NEEDED
Status: DISCONTINUED | OUTPATIENT
Start: 2018-06-11 | End: 2018-06-11 | Stop reason: HOSPADM

## 2018-06-11 RX ORDER — SCOLOPAMINE TRANSDERMAL SYSTEM 1 MG/1
1 PATCH, EXTENDED RELEASE TRANSDERMAL ONCE
Status: DISCONTINUED | OUTPATIENT
Start: 2018-06-11 | End: 2018-06-11

## 2018-06-11 RX ORDER — ONDANSETRON 2 MG/ML
4 INJECTION INTRAMUSCULAR; INTRAVENOUS EVERY 4 HOURS PRN
Status: DISCONTINUED | OUTPATIENT
Start: 2018-06-11 | End: 2018-06-13 | Stop reason: HOSPADM

## 2018-06-11 RX ORDER — HYDROMORPHONE HYDROCHLORIDE 2 MG/1
2 TABLET ORAL EVERY 4 HOURS PRN
Status: DISCONTINUED | OUTPATIENT
Start: 2018-06-11 | End: 2018-06-13 | Stop reason: HOSPADM

## 2018-06-11 RX ORDER — DIPHENHYDRAMINE HYDROCHLORIDE 50 MG/ML
12.5 INJECTION INTRAMUSCULAR; INTRAVENOUS
Status: DISCONTINUED | OUTPATIENT
Start: 2018-06-11 | End: 2018-06-11 | Stop reason: HOSPADM

## 2018-06-11 RX ORDER — LIDOCAINE HYDROCHLORIDE 20 MG/ML
INJECTION, SOLUTION INFILTRATION; PERINEURAL AS NEEDED
Status: DISCONTINUED | OUTPATIENT
Start: 2018-06-11 | End: 2018-06-11 | Stop reason: SURG

## 2018-06-11 RX ORDER — FAMOTIDINE 10 MG/ML
20 INJECTION, SOLUTION INTRAVENOUS ONCE
Status: COMPLETED | OUTPATIENT
Start: 2018-06-11 | End: 2018-06-11

## 2018-06-11 RX ORDER — NALOXONE HCL 0.4 MG/ML
0.1 VIAL (ML) INJECTION
Status: DISCONTINUED | OUTPATIENT
Start: 2018-06-11 | End: 2018-06-12

## 2018-06-11 RX ORDER — SCOLOPAMINE TRANSDERMAL SYSTEM 1 MG/1
1 PATCH, EXTENDED RELEASE TRANSDERMAL ONCE
Status: DISCONTINUED | OUTPATIENT
Start: 2018-06-11 | End: 2018-06-11 | Stop reason: HOSPADM

## 2018-06-11 RX ORDER — HYDROMORPHONE HYDROCHLORIDE 1 MG/ML
0.5 INJECTION, SOLUTION INTRAMUSCULAR; INTRAVENOUS; SUBCUTANEOUS
Status: DISCONTINUED | OUTPATIENT
Start: 2018-06-11 | End: 2018-06-12

## 2018-06-11 RX ORDER — CEFAZOLIN SODIUM IN 0.9 % NACL 3 G/100 ML
3 INTRAVENOUS SOLUTION, PIGGYBACK (ML) INTRAVENOUS
Status: COMPLETED | OUTPATIENT
Start: 2018-06-11 | End: 2018-06-11

## 2018-06-11 RX ORDER — MIDAZOLAM HYDROCHLORIDE 1 MG/ML
2 INJECTION INTRAMUSCULAR; INTRAVENOUS
Status: DISCONTINUED | OUTPATIENT
Start: 2018-06-11 | End: 2018-06-11 | Stop reason: HOSPADM

## 2018-06-11 RX ORDER — OXYCODONE AND ACETAMINOPHEN 7.5; 325 MG/1; MG/1
1 TABLET ORAL ONCE AS NEEDED
Status: DISCONTINUED | OUTPATIENT
Start: 2018-06-11 | End: 2018-06-11 | Stop reason: HOSPADM

## 2018-06-11 RX ORDER — BUPIVACAINE HYDROCHLORIDE AND EPINEPHRINE 5; 5 MG/ML; UG/ML
INJECTION, SOLUTION PERINEURAL AS NEEDED
Status: DISCONTINUED | OUTPATIENT
Start: 2018-06-11 | End: 2018-06-11 | Stop reason: HOSPADM

## 2018-06-11 RX ORDER — ALBUTEROL SULFATE 2.5 MG/3ML
SOLUTION RESPIRATORY (INHALATION)
Status: COMPLETED
Start: 2018-06-11 | End: 2018-06-11

## 2018-06-11 RX ORDER — CHLORHEXIDINE GLUCONATE 0.12 MG/ML
15 RINSE ORAL SEE ADMIN INSTRUCTIONS
Status: COMPLETED | OUTPATIENT
Start: 2018-06-11 | End: 2018-06-11

## 2018-06-11 RX ORDER — MAGNESIUM HYDROXIDE 1200 MG/15ML
LIQUID ORAL AS NEEDED
Status: DISCONTINUED | OUTPATIENT
Start: 2018-06-11 | End: 2018-06-11 | Stop reason: HOSPADM

## 2018-06-11 RX ORDER — ROCURONIUM BROMIDE 10 MG/ML
INJECTION, SOLUTION INTRAVENOUS AS NEEDED
Status: DISCONTINUED | OUTPATIENT
Start: 2018-06-11 | End: 2018-06-11 | Stop reason: SURG

## 2018-06-11 RX ORDER — PROPOFOL 10 MG/ML
VIAL (ML) INTRAVENOUS AS NEEDED
Status: DISCONTINUED | OUTPATIENT
Start: 2018-06-11 | End: 2018-06-11 | Stop reason: SURG

## 2018-06-11 RX ORDER — CLONIDINE HYDROCHLORIDE 0.1 MG/1
0.1 TABLET ORAL EVERY 6 HOURS PRN
Status: DISCONTINUED | OUTPATIENT
Start: 2018-06-11 | End: 2018-06-13 | Stop reason: HOSPADM

## 2018-06-11 RX ORDER — LABETALOL HYDROCHLORIDE 5 MG/ML
5 INJECTION, SOLUTION INTRAVENOUS
Status: DISCONTINUED | OUTPATIENT
Start: 2018-06-11 | End: 2018-06-11 | Stop reason: HOSPADM

## 2018-06-11 RX ORDER — SODIUM CHLORIDE, SODIUM LACTATE, POTASSIUM CHLORIDE, CALCIUM CHLORIDE 600; 310; 30; 20 MG/100ML; MG/100ML; MG/100ML; MG/100ML
150 INJECTION, SOLUTION INTRAVENOUS CONTINUOUS
Status: DISCONTINUED | OUTPATIENT
Start: 2018-06-11 | End: 2018-06-13 | Stop reason: HOSPADM

## 2018-06-11 RX ORDER — LIDOCAINE HYDROCHLORIDE 10 MG/ML
0.5 INJECTION, SOLUTION EPIDURAL; INFILTRATION; INTRACAUDAL; PERINEURAL ONCE AS NEEDED
Status: DISCONTINUED | OUTPATIENT
Start: 2018-06-11 | End: 2018-06-11 | Stop reason: HOSPADM

## 2018-06-11 RX ORDER — SODIUM CHLORIDE, SODIUM LACTATE, POTASSIUM CHLORIDE, CALCIUM CHLORIDE 600; 310; 30; 20 MG/100ML; MG/100ML; MG/100ML; MG/100ML
9 INJECTION, SOLUTION INTRAVENOUS CONTINUOUS
Status: DISCONTINUED | OUTPATIENT
Start: 2018-06-11 | End: 2018-06-11 | Stop reason: HOSPADM

## 2018-06-11 RX ORDER — NALOXONE HCL 0.4 MG/ML
0.2 VIAL (ML) INJECTION AS NEEDED
Status: DISCONTINUED | OUTPATIENT
Start: 2018-06-11 | End: 2018-06-11 | Stop reason: HOSPADM

## 2018-06-11 RX ORDER — HYDRALAZINE HYDROCHLORIDE 20 MG/ML
5 INJECTION INTRAMUSCULAR; INTRAVENOUS
Status: DISCONTINUED | OUTPATIENT
Start: 2018-06-11 | End: 2018-06-11 | Stop reason: HOSPADM

## 2018-06-11 RX ORDER — FLUMAZENIL 0.1 MG/ML
0.2 INJECTION INTRAVENOUS AS NEEDED
Status: DISCONTINUED | OUTPATIENT
Start: 2018-06-11 | End: 2018-06-11 | Stop reason: HOSPADM

## 2018-06-11 RX ORDER — ALBUTEROL SULFATE 2.5 MG/3ML
2.5 SOLUTION RESPIRATORY (INHALATION)
Status: DISCONTINUED | OUTPATIENT
Start: 2018-06-11 | End: 2018-06-13 | Stop reason: HOSPADM

## 2018-06-11 RX ORDER — SODIUM CHLORIDE, SODIUM LACTATE, POTASSIUM CHLORIDE, CALCIUM CHLORIDE 600; 310; 30; 20 MG/100ML; MG/100ML; MG/100ML; MG/100ML
100 INJECTION, SOLUTION INTRAVENOUS CONTINUOUS
Status: DISCONTINUED | OUTPATIENT
Start: 2018-06-11 | End: 2018-06-11 | Stop reason: HOSPADM

## 2018-06-11 RX ORDER — METOCLOPRAMIDE HYDROCHLORIDE 5 MG/ML
10 INJECTION INTRAMUSCULAR; INTRAVENOUS ONCE
Status: COMPLETED | OUTPATIENT
Start: 2018-06-11 | End: 2018-06-11

## 2018-06-11 RX ORDER — MIDAZOLAM HYDROCHLORIDE 1 MG/ML
1 INJECTION INTRAMUSCULAR; INTRAVENOUS
Status: DISCONTINUED | OUTPATIENT
Start: 2018-06-11 | End: 2018-06-11 | Stop reason: HOSPADM

## 2018-06-11 RX ORDER — ACETAMINOPHEN 325 MG/1
650 TABLET ORAL EVERY 4 HOURS PRN
Status: DISCONTINUED | OUTPATIENT
Start: 2018-06-11 | End: 2018-06-13 | Stop reason: HOSPADM

## 2018-06-11 RX ORDER — ACETAMINOPHEN 650 MG/1
650 SUPPOSITORY RECTAL EVERY 4 HOURS PRN
Status: DISCONTINUED | OUTPATIENT
Start: 2018-06-11 | End: 2018-06-13 | Stop reason: HOSPADM

## 2018-06-11 RX ORDER — FAMOTIDINE 10 MG/ML
20 INJECTION, SOLUTION INTRAVENOUS ONCE
Status: DISCONTINUED | OUTPATIENT
Start: 2018-06-11 | End: 2018-06-11 | Stop reason: HOSPADM

## 2018-06-11 RX ORDER — ACETAMINOPHEN 160 MG/5ML
650 SOLUTION ORAL EVERY 4 HOURS PRN
Status: DISCONTINUED | OUTPATIENT
Start: 2018-06-11 | End: 2018-06-13 | Stop reason: HOSPADM

## 2018-06-11 RX ORDER — HYDROMORPHONE HYDROCHLORIDE 1 MG/ML
0.5 INJECTION, SOLUTION INTRAMUSCULAR; INTRAVENOUS; SUBCUTANEOUS
Status: DISCONTINUED | OUTPATIENT
Start: 2018-06-11 | End: 2018-06-11 | Stop reason: HOSPADM

## 2018-06-11 RX ORDER — LABETALOL HYDROCHLORIDE 5 MG/ML
10 INJECTION, SOLUTION INTRAVENOUS
Status: DISCONTINUED | OUTPATIENT
Start: 2018-06-11 | End: 2018-06-13 | Stop reason: HOSPADM

## 2018-06-11 RX ORDER — PROMETHAZINE HYDROCHLORIDE 25 MG/1
12.5 TABLET ORAL ONCE AS NEEDED
Status: DISCONTINUED | OUTPATIENT
Start: 2018-06-11 | End: 2018-06-11 | Stop reason: HOSPADM

## 2018-06-11 RX ORDER — SODIUM CHLORIDE 9 MG/ML
INJECTION, SOLUTION INTRAVENOUS AS NEEDED
Status: DISCONTINUED | OUTPATIENT
Start: 2018-06-11 | End: 2018-06-11 | Stop reason: HOSPADM

## 2018-06-11 RX ORDER — LORAZEPAM 1 MG/1
1 TABLET ORAL EVERY 12 HOURS PRN
Status: DISCONTINUED | OUTPATIENT
Start: 2018-06-11 | End: 2018-06-13 | Stop reason: HOSPADM

## 2018-06-11 RX ORDER — FAMOTIDINE 10 MG/ML
20 INJECTION, SOLUTION INTRAVENOUS EVERY 12 HOURS SCHEDULED
Status: DISCONTINUED | OUTPATIENT
Start: 2018-06-11 | End: 2018-06-13 | Stop reason: HOSPADM

## 2018-06-11 RX ORDER — LAMOTRIGINE 300 MG/1
300 TABLET, EXTENDED RELEASE ORAL NIGHTLY
Status: DISCONTINUED | OUTPATIENT
Start: 2018-06-11 | End: 2018-06-13 | Stop reason: HOSPADM

## 2018-06-11 RX ORDER — ONDANSETRON 2 MG/ML
4 INJECTION INTRAMUSCULAR; INTRAVENOUS ONCE AS NEEDED
Status: DISCONTINUED | OUTPATIENT
Start: 2018-06-11 | End: 2018-06-11 | Stop reason: HOSPADM

## 2018-06-11 RX ORDER — PROMETHAZINE HYDROCHLORIDE 25 MG/1
25 TABLET ORAL ONCE AS NEEDED
Status: DISCONTINUED | OUTPATIENT
Start: 2018-06-11 | End: 2018-06-11 | Stop reason: HOSPADM

## 2018-06-11 RX ORDER — DIPHENHYDRAMINE HYDROCHLORIDE 50 MG/ML
25 INJECTION INTRAMUSCULAR; INTRAVENOUS EVERY 4 HOURS PRN
Status: DISCONTINUED | OUTPATIENT
Start: 2018-06-11 | End: 2018-06-13 | Stop reason: HOSPADM

## 2018-06-11 RX ORDER — CYANOCOBALAMIN 1000 UG/ML
1000 INJECTION, SOLUTION INTRAMUSCULAR; SUBCUTANEOUS ONCE
Status: COMPLETED | OUTPATIENT
Start: 2018-06-12 | End: 2018-06-12

## 2018-06-11 RX ORDER — MORPHINE SULFATE 2 MG/ML
2 INJECTION, SOLUTION INTRAMUSCULAR; INTRAVENOUS
Status: DISCONTINUED | OUTPATIENT
Start: 2018-06-11 | End: 2018-06-13 | Stop reason: HOSPADM

## 2018-06-11 RX ORDER — ONDANSETRON 2 MG/ML
INJECTION INTRAMUSCULAR; INTRAVENOUS AS NEEDED
Status: DISCONTINUED | OUTPATIENT
Start: 2018-06-11 | End: 2018-06-11 | Stop reason: SURG

## 2018-06-11 RX ORDER — EPHEDRINE SULFATE 50 MG/ML
5 INJECTION, SOLUTION INTRAVENOUS ONCE AS NEEDED
Status: DISCONTINUED | OUTPATIENT
Start: 2018-06-11 | End: 2018-06-11 | Stop reason: HOSPADM

## 2018-06-11 RX ORDER — ONDANSETRON 4 MG/1
4 TABLET, FILM COATED ORAL EVERY 4 HOURS PRN
Status: DISCONTINUED | OUTPATIENT
Start: 2018-06-11 | End: 2018-06-13 | Stop reason: HOSPADM

## 2018-06-11 RX ORDER — CEFAZOLIN SODIUM 2 G/100ML
INJECTION, SOLUTION INTRAVENOUS AS NEEDED
Status: DISCONTINUED | OUTPATIENT
Start: 2018-06-11 | End: 2018-06-11 | Stop reason: SURG

## 2018-06-11 RX ORDER — NALOXONE HCL 0.4 MG/ML
0.4 VIAL (ML) INJECTION
Status: DISCONTINUED | OUTPATIENT
Start: 2018-06-11 | End: 2018-06-13 | Stop reason: HOSPADM

## 2018-06-11 RX ORDER — ACETAMINOPHEN 160 MG/5ML
975 SOLUTION ORAL ONCE
Status: COMPLETED | OUTPATIENT
Start: 2018-06-11 | End: 2018-06-11

## 2018-06-11 RX ORDER — EPHEDRINE SULFATE 50 MG/ML
INJECTION, SOLUTION INTRAVENOUS AS NEEDED
Status: DISCONTINUED | OUTPATIENT
Start: 2018-06-11 | End: 2018-06-11 | Stop reason: SURG

## 2018-06-11 RX ORDER — DEXAMETHASONE SODIUM PHOSPHATE 10 MG/ML
INJECTION INTRAMUSCULAR; INTRAVENOUS AS NEEDED
Status: DISCONTINUED | OUTPATIENT
Start: 2018-06-11 | End: 2018-06-11 | Stop reason: SURG

## 2018-06-11 RX ORDER — PROMETHAZINE HYDROCHLORIDE 25 MG/1
25 SUPPOSITORY RECTAL ONCE AS NEEDED
Status: DISCONTINUED | OUTPATIENT
Start: 2018-06-11 | End: 2018-06-11 | Stop reason: HOSPADM

## 2018-06-11 RX ADMIN — GLYCOPYRROLATE 0.2 MG: 0.2 INJECTION INTRAMUSCULAR; INTRAVENOUS at 08:04

## 2018-06-11 RX ADMIN — SODIUM CHLORIDE, POTASSIUM CHLORIDE, SODIUM LACTATE AND CALCIUM CHLORIDE 500 ML: 600; 310; 30; 20 INJECTION, SOLUTION INTRAVENOUS at 06:57

## 2018-06-11 RX ADMIN — FENTANYL CITRATE 50 MCG: 50 INJECTION INTRAMUSCULAR; INTRAVENOUS at 07:55

## 2018-06-11 RX ADMIN — FAMOTIDINE 20 MG: 10 INJECTION, SOLUTION INTRAVENOUS at 07:02

## 2018-06-11 RX ADMIN — HYDROMORPHONE HYDROCHLORIDE 0.5 MG: 1 INJECTION, SOLUTION INTRAMUSCULAR; INTRAVENOUS; SUBCUTANEOUS at 09:21

## 2018-06-11 RX ADMIN — SCOPALAMINE 1 PATCH: 1 PATCH, EXTENDED RELEASE TRANSDERMAL at 07:01

## 2018-06-11 RX ADMIN — HYDROMORPHONE HYDROCHLORIDE 0.5 MG: 1 INJECTION, SOLUTION INTRAMUSCULAR; INTRAVENOUS; SUBCUTANEOUS at 09:31

## 2018-06-11 RX ADMIN — HYOSCYAMINE SULFATE 125 MCG: 0.12 TABLET ORAL at 12:24

## 2018-06-11 RX ADMIN — ONDANSETRON 4 MG: 2 INJECTION INTRAMUSCULAR; INTRAVENOUS at 15:20

## 2018-06-11 RX ADMIN — HYDROMORPHONE HYDROCHLORIDE 2 MG: 2 TABLET ORAL at 15:38

## 2018-06-11 RX ADMIN — HYOSCYAMINE SULFATE 125 MCG: 0.12 TABLET ORAL at 17:36

## 2018-06-11 RX ADMIN — ATROPINE SULFATE 0.4 MG: 0.4 INJECTION, SOLUTION INTRAMUSCULAR; INTRAVENOUS; SUBCUTANEOUS at 08:05

## 2018-06-11 RX ADMIN — METOCLOPRAMIDE 10 MG: 5 INJECTION, SOLUTION INTRAMUSCULAR; INTRAVENOUS at 07:02

## 2018-06-11 RX ADMIN — MIDAZOLAM 2 MG: 1 INJECTION INTRAMUSCULAR; INTRAVENOUS at 07:17

## 2018-06-11 RX ADMIN — FAMOTIDINE 20 MG: 10 INJECTION INTRAVENOUS at 15:08

## 2018-06-11 RX ADMIN — LIDOCAINE HYDROCHLORIDE 40 MG: 20 INJECTION, SOLUTION INFILTRATION; PERINEURAL at 07:55

## 2018-06-11 RX ADMIN — SODIUM CHLORIDE, POTASSIUM CHLORIDE, SODIUM LACTATE AND CALCIUM CHLORIDE 150 ML/HR: 600; 310; 30; 20 INJECTION, SOLUTION INTRAVENOUS at 15:08

## 2018-06-11 RX ADMIN — ONDANSETRON 4 MG: 2 INJECTION INTRAMUSCULAR; INTRAVENOUS at 08:46

## 2018-06-11 RX ADMIN — ALBUTEROL SULFATE 2.5 MG: 2.5 SOLUTION RESPIRATORY (INHALATION) at 12:00

## 2018-06-11 RX ADMIN — FAMOTIDINE 20 MG: 10 INJECTION INTRAVENOUS at 22:45

## 2018-06-11 RX ADMIN — ROCURONIUM BROMIDE 40 MG: 10 INJECTION INTRAVENOUS at 07:55

## 2018-06-11 RX ADMIN — FOLIC ACID 250 ML/HR: 5 INJECTION, SOLUTION INTRAMUSCULAR; INTRAVENOUS; SUBCUTANEOUS at 23:42

## 2018-06-11 RX ADMIN — METOCLOPRAMIDE 10 MG: 5 INJECTION, SOLUTION INTRAMUSCULAR; INTRAVENOUS at 22:45

## 2018-06-11 RX ADMIN — CEFAZOLIN SODIUM 3 G: 10 INJECTION, POWDER, FOR SOLUTION INTRAVENOUS at 07:24

## 2018-06-11 RX ADMIN — HYOSCYAMINE SULFATE 125 MCG: 0.12 TABLET ORAL at 20:11

## 2018-06-11 RX ADMIN — CEFAZOLIN SODIUM 3 G: 2 INJECTION, SOLUTION INTRAVENOUS at 07:57

## 2018-06-11 RX ADMIN — LAMOTRIGINE 300 MG: 300 TABLET, EXTENDED RELEASE ORAL at 22:45

## 2018-06-11 RX ADMIN — HYDROMORPHONE HYDROCHLORIDE 0.5 MG: 10 INJECTION INTRAMUSCULAR; INTRAVENOUS; SUBCUTANEOUS at 12:24

## 2018-06-11 RX ADMIN — FENTANYL CITRATE 50 MCG: 50 INJECTION INTRAMUSCULAR; INTRAVENOUS at 09:16

## 2018-06-11 RX ADMIN — ALBUTEROL SULFATE 2.5 MG: 2.5 SOLUTION RESPIRATORY (INHALATION) at 20:48

## 2018-06-11 RX ADMIN — PROPOFOL 150 MG: 10 INJECTION, EMULSION INTRAVENOUS at 07:55

## 2018-06-11 RX ADMIN — SODIUM CHLORIDE, POTASSIUM CHLORIDE, SODIUM LACTATE AND CALCIUM CHLORIDE: 600; 310; 30; 20 INJECTION, SOLUTION INTRAVENOUS at 08:58

## 2018-06-11 RX ADMIN — SUGAMMADEX 220 MG: 100 INJECTION, SOLUTION INTRAVENOUS at 08:47

## 2018-06-11 RX ADMIN — FENTANYL CITRATE 50 MCG: 50 INJECTION INTRAMUSCULAR; INTRAVENOUS at 09:26

## 2018-06-11 RX ADMIN — DEXAMETHASONE SODIUM PHOSPHATE 8 MG: 10 INJECTION INTRAMUSCULAR; INTRAVENOUS at 08:08

## 2018-06-11 RX ADMIN — HYDROCODONE BITARTRATE AND ACETAMINOPHEN 15 ML: 7.5; 325 SOLUTION ORAL at 20:11

## 2018-06-11 RX ADMIN — SODIUM CHLORIDE, POTASSIUM CHLORIDE, SODIUM LACTATE AND CALCIUM CHLORIDE 150 ML/HR: 600; 310; 30; 20 INJECTION, SOLUTION INTRAVENOUS at 20:01

## 2018-06-11 RX ADMIN — FENTANYL CITRATE 50 MCG: 50 INJECTION INTRAMUSCULAR; INTRAVENOUS at 08:55

## 2018-06-11 RX ADMIN — EPHEDRINE SULFATE 15 MG: 50 INJECTION INTRAMUSCULAR; INTRAVENOUS; SUBCUTANEOUS at 08:08

## 2018-06-11 RX ADMIN — CHLORHEXIDINE GLUCONATE 15 ML: 1.2 RINSE ORAL at 07:01

## 2018-06-11 RX ADMIN — PHENYLEPHRINE HYDROCHLORIDE 100 MCG: 10 INJECTION INTRAVENOUS at 08:08

## 2018-06-11 RX ADMIN — ENOXAPARIN SODIUM 40 MG: 40 INJECTION SUBCUTANEOUS at 07:23

## 2018-06-11 RX ADMIN — ACETAMINOPHEN 975 MG: 325 SOLUTION ORAL at 07:00

## 2018-06-11 RX ADMIN — METOCLOPRAMIDE 10 MG: 5 INJECTION, SOLUTION INTRAMUSCULAR; INTRAVENOUS at 15:08

## 2018-06-11 NOTE — ANESTHESIA POSTPROCEDURE EVALUATION
Patient: Karen Alarcon    Procedure Summary     Date:  06/11/18 Room / Location:   [facility]  /  [facility]    Anesthesia Start:  0747 Anesthesia Stop:  0904    Procedure:  GASTRIC SLEEVE LAPAROSCOPIC (N/A Abdomen) Diagnosis:       Severe obesity (BMI 35.0-39.9)      (Severe obesity (BMI 35.0-39.9) [E66.01])    Surgeon:  Mick Posadas Jr., MD Provider:  Doug Olivera MD    Anesthesia Type:  general ASA Status:  3          Anesthesia Type: general  Last vitals  BP   134/83 (06/11/18 1000)   Temp   36.6 °C (97.9 °F) (06/11/18 0652)   Pulse   93 (06/11/18 1000)   Resp   16 (06/11/18 1000)     SpO2   97 % (06/11/18 1000)     Post Anesthesia Care and Evaluation    Patient location during evaluation: PACU  Patient participation: complete - patient participated  Level of consciousness: awake and alert  Pain management: adequate  Airway patency: patent  Anesthetic complications: No anesthetic complications    Cardiovascular status: acceptable  Respiratory status: acceptable  Hydration status: acceptable    Comments: /83 (BP Location: Right arm, Patient Position: Lying)   Pulse 93   Temp 36.6 °C (97.9 °F) (Oral)   Resp 16   SpO2 97%

## 2018-06-11 NOTE — ANESTHESIA PREPROCEDURE EVALUATION
Anesthesia Evaluation     Patient summary reviewed and Nursing notes reviewed   no history of anesthetic complications:               Airway   Mallampati: II  TM distance: >3 FB  Neck ROM: full  no difficulty expected  Dental - normal exam     Pulmonary     breath sounds clear to auscultation  (+) sleep apnea,   (-) shortness of breath, decreased breath sounds, wheezes  Cardiovascular - normal exam  Exercise tolerance: good (4-7 METS)    Rhythm: regular  Rate: normal    (+) hypertension, DVT resolved,   (-) past MI, angina, CHF, orthopnea, PND, VITALE, PVD      Neuro/Psych  (+) psychiatric history Anxiety and Depression,     (-) seizures, neuromuscular disease, TIA, CVA, dizziness/light headedness, weakness, numbness  GI/Hepatic/Renal/Endo    (+) morbid obesity, GERD,    (-) liver disease, diabetesRenal disease: creatinine on 6/7 was 1.13.    Musculoskeletal     (+) arthralgias, joint swelling,   Abdominal  - normal exam   Substance History - negative use  (-) alcohol use, drug use     OB/GYN negative ob/gyn ROS         Other   (+) blood dyscrasia (Hereditary Spherocytosis)                     Anesthesia Plan    ASA 3     general     intravenous induction   Anesthetic plan and risks discussed with patient.    Plan discussed with CRNA.

## 2018-06-11 NOTE — ANESTHESIA PROCEDURE NOTES
Airway  Urgency: elective    Airway not difficult    General Information and Staff    Patient location during procedure: OR  Anesthesiologist: LADAN DENNIS  CRNA: AMELIA POLLOCK    Indications and Patient Condition  Indications for airway management: airway protection    Preoxygenated: yes  MILS maintained throughout  Mask difficulty assessment: 1 - vent by mask    Final Airway Details  Final airway type: endotracheal airway      Successful airway: ETT  Cuffed: yes   Successful intubation technique: direct laryngoscopy  Facilitating devices/methods: intubating stylet  Endotracheal tube insertion site: oral  Blade: Graves  Blade size: #2  ETT size: 7.0 mm  Cormack-Lehane Classification: grade I - full view of glottis  Placement verified by: chest auscultation and capnometry   Cuff volume (mL): 7  Measured from: teeth  ETT to teeth (cm): 20  Number of attempts at approach: 1

## 2018-06-11 NOTE — H&P (VIEW-ONLY)
Bariatric Consult:  Referred by Anibal Callejas MD    Karen Alarcon is here today for consult on Consult (Consult GS)      History of Present Illness:     Karen Alarcon is a 47 y.o. female with morbid obesity with co-morbidities including sleep apnea, hypertension, osteoarthritis, previous DVT and depression who presents for surgical consultation for the above procedure. Karen has completed the initial intake visit and has been examined by our nurse practitioner, dietician, psychologist and underwent the extensive educational teaching process under the guidance of our bariatric coordinator and myself. Karen also has seen the educational video RAYMOND on the surgical procedure if available. Karen attended today more educational teaching from our bariatric coordinator and myself. Karen has had an extensive medical workup including a visit with their primary care physician, EKG, chest radiograph, blood work, EGD or UGI and possibly further testing. These have been reviewed by me and discussed with the patient. Karen is now ready to proceed with surgery. Karen presently denies nausea, vomiting, fever, chills, chest pain, shortness of air, melena, hematochezia, hemetemesis, dysuria, frequency, hematuria, jaundice or abdominal pain.       Past Medical History:   Diagnosis Date   • Abdominal pain    • Anxiety and depression    • Blood disorder     hereditary spherocytosis   • Bowel habit changes    • Daytime sleepiness    • Hereditary spherocytosis 9/11/2017   • Hypertension    • Itching    • Joint pain    • Seizure disorder     spherocytosis   • Sleep apnea    • Stress incontinence        Encounter Diagnoses   Name Primary?   • Obesity, Class II, BMI 35-39.9 Yes   • Chronic fatigue    • Essential hypertension    • DESIREE (obstructive sleep apnea)    • Heartburn    • Urge incontinence    • Multiple joint pain    • Depression, unspecified depression type    • History of DVT (deep vein thrombosis)    • Hereditary  spherocytosis    • History of splenectomy        Past Surgical History:   Procedure Laterality Date   • CHOLECYSTECTOMY WITH INTRAOPERATIVE CHOLANGIOGRAM N/A 12/29/2016    Procedure: CHOLECYSTECTOMY LAPAROSCOPIC INTRAOPERATIVE CHOLANGIOGRAM;  Surgeon: Lázaro Leija MD;  Location: [facility] OR;  Service:    • HYSTERECTOMY  2008    partial   • KNEE ACL RECONSTRUCTION Bilateral 2006, 2007   • SINUS SURGERY  1975   • SPLENECTOMY  1975   • TONSILLECTOMY  1978       Patient Active Problem List   Diagnosis   • Obesity, Class II, BMI 35-39.9   • Chronic fatigue   • Essential hypertension   • DESIREE (obstructive sleep apnea)   • Snoring   • Heartburn   • Hemorrhoids   • Urge incontinence   • Multiple joint pain   • History of seizure   • Depression   • Hereditary spherocytosis   • History of DVT (deep vein thrombosis)   • History of splenectomy       No Known Allergies      Current Outpatient Prescriptions:   •  LamoTRIgine  MG tablet sustained-release 24 hour, Take 300 mg by mouth Every Night., Disp: , Rfl:   •  nitrofurantoin, macrocrystal-monohydrate, (MACROBID) 100 MG capsule, Take 100 mg by mouth Every Night., Disp: , Rfl:   •  valACYclovir (VALTREX) 1000 MG tablet, Take 1,000 mg by mouth Every Night., Disp: , Rfl:   •  vilazodone (VIIBRYD) 40 MG tablet tablet, Take 40 mg by mouth Every Night., Disp: , Rfl:   •  folic acid (FOLVITE) 1 MG tablet, Take 1 tablet by mouth Daily. Begin two weeks prior to surgery, Disp: 40 tablet, Rfl: 0    Social History     Social History   • Marital status:      Spouse name: Ricki Alarcon   • Number of children: 2   • Years of education: N/A     Occupational History   • Teacher      Social History Main Topics   • Smoking status: Never Smoker   • Smokeless tobacco: Never Used   • Alcohol use No   • Drug use: No   • Sexual activity: Defer     Other Topics Concern   • Not on file     Social History Narrative   • No narrative on file       Family History   Problem Relation Age  of Onset   • Bleeding Disorder Mother    • Cancer Father        Review of Systems:  Review of Systems   Constitutional: Positive for fatigue.   Gastrointestinal: Positive for diarrhea and nausea.   Musculoskeletal: Positive for arthralgias.   All other systems reviewed and are negative.        Physical Exam:    Vital Signs:  Weight: 110 kg (242 lb)   Body mass index is 37.89 kg/m².      Heart Rate: 77   BP: 133/88       Physical Exam   Constitutional: She is oriented to person, place, and time. She appears well-nourished.   HENT:   Head: Normocephalic and atraumatic.   Mouth/Throat: Oropharynx is clear and moist.   Eyes: Conjunctivae and EOM are normal. Pupils are equal, round, and reactive to light. No scleral icterus.   Neck: Normal range of motion. Neck supple. No thyromegaly present.   Cardiovascular: Normal rate and regular rhythm.    Pulmonary/Chest: Effort normal and breath sounds normal.   Abdominal: Soft. Bowel sounds are normal. She exhibits no distension and no mass. There is no tenderness. There is no rebound and no guarding. No hernia.   Musculoskeletal: Normal range of motion.   Lymphadenopathy:     She has no cervical adenopathy.   Neurological: She is alert and oriented to person, place, and time. No cranial nerve deficit. Coordination normal.   Skin: Skin is warm and dry. No erythema.   Psychiatric: She has a normal mood and affect. Her behavior is normal.   Vitals reviewed.        Assessment:    Karen Alarcon is a 47 y.o. year old female with medically complicated severe obesity with a BMI of Body mass index is 37.89 kg/m². and multiple co-morbidities listed in the encounter diagnosis.    I think she is an appropriate candidate for this surgery, and is ready to proceed.      Plan/Discussion/Summary:  No hiatal hernia per me.  No PPI.  Helical back to pylori negative.  Patient status post open splenectomy secondary to hereditary spherocytosis.    The patient has returned to the office for a  surgical consultation and has requested to proceed with a laparoscopic gastric sleeve.  I have had the opportunity to obtain a history, examine the patient and review the patient's chart.    The patient understands that surgery is a tool and that weight loss is not guaranteed but only seen in the context of appropriate use, regular follow up, exercise and making appropriate food choices.     I personally discussed the potential complications of the laparoscopic gastric sleeve with this patient.  The patient is well aware of potential complications of the surgery that include but not limited to bleeding, infections, deep vein thrombosis, pulmonary embolism, pulmonary complications such as pneumonia, cardiac event, hernias, small bowel obstruction, damage to the spleen or other organs, bowel injury, disfiguring scars, failure to lose weight, need for additional surgery, conversion to an open procedure and death.  The patient is also aware of complications which apply in particular to the gastric sleeve and can include but not limited to the leakage of gastric contents at the staple line, the development of an intra-abdominal abscess, gastroesophageal reflux disease, Capps's esophagus, ulcers, vitamin/mineral deficiencies, strictures, and the possibility of converting this procedure to a Umu-en-Y gastric bypass. The patient also understands the possibility of requiring an acid reducer medication for the rest of their life.    The risks, benefits, potential complications and alternative therapies were discussed at great length as outlined in our extensive consent forms, online consent and educational teaching processes.    The patient has confirmed the participation in the programs extensive educational activities.    All questions and concerns were answered to patient's satisfaction.  The patient now wishes to proceed with surgery.    Patient has declined the pre-operative insertion of an IVC filter.     The patient  has agreed to a postoperative course of anitcoagulant therapy.        I instructed patient to start on a H2 blocker or proton pump inhibitor if not already on one of these medications.    I explained in detail the procedures that we perform.  All of these procedures have a chance to convert to open if any technical challenges or complications do occur.  Bariatric surgery is not cosmetic surgery but rather a tool to help a patient make a life-long commitment lifestyle change including diet, exercise, behavior changes, and taking supplemental vitamins and minerals.    Problems after surgery may require more operations to correct them.    The risks, benefits, alternatives, and potential complications of all of the procedures were explained in detail including, but not limited to death, anesthesia and medication adverse effect, deep venous thrombosis, pulmonary embolism, trocar site/incisional hernia, wound infection, abdominal infection, bleeding, failure to lose weight, gain weight, a change in body image, metabolic complications with vitamin deficiences and anemia.    Weight loss expectations were discussed with the patient in detail. The weight loss operations most commonly performed are the sleeve gastrectomy and the Umu-en-Y gastric bypass. These operations result in weight losses up to approximately 25-35% of initial body weight 12 to 24 months after surgery with the gastric bypass usually the higher percent of weight loss but depends on patient using the tool.    For the gastric bypass and loop duodenal switch (ZOE-S) the risks include but not limited to the following early complications:  Anastomotic leak/peritonitis, Umu/Alimentary/biliopancreatic limb obstruction, severe & minor wound infection/seroma, and nausea/vomiting.  Late complications can include but are not limited to malnutrition, vitamin deficiencies, frequent loose stools,  stomal stenosis, marginal ulcer, bowel obstruction, intussusception,  internal, and incisional hernia.    Regarding the gastric sleeve, there is less long-term outcome data and higher risk of dysphagia and reflux compared to a gastric bypass, as well as risk of internal visceral/organ injury, splenectomy, bleeding, infection, leak (which could require further intervention possible conversion to Umu-en-Y gastric bypass), stenosis and possibility of regaining weight.    Karen was counseled regarding diagnostic results, instructions for management, risk factor reductions, prognosis, patient and family education, impressions, risks and benefits of treatment options and importance of compliance with treatment. Total face to face time of the encounter was over 45 minutes and over 30 minutes was spent counseling.     Jt Report   As part of this patient's treatment plan I am prescribing controlled substances. The patient has been made aware of appropriate use of such medications, including potential risk of somnolence, limited ability to drive and /or work safely, and potential for dependence or overdose. It has also been made clear that these medications are for use by this patient only, without concomitant use of alcohol or other substances unless prescribed.    Karen has completed prescribing agreement detailing terms of continued prescribing of controlled substances, including monitoring JT reports, urine drug screening, and pill counts if necessary. Karen is aware that inappropriate use will result in cessation of prescribing such medications.    JT report has been reviewed      History and physical exam exhibit continued safe and appropriate use of controlled substances.      Karen understands the surgical procedures and the different surgical options that are available.  She understands the lifestyle changes that are required after surgery and has agreed to follow the guidelines outlined in the weight management program.  She also expressed understanding of the risks  involved and had all of female questions answered and desires to proceed.      Mick Posadas MD  5/17/2018

## 2018-06-11 NOTE — OP NOTE
PREOPERATIVE DIAGNOSIS:  Morbid obesity with multiple comorbidities as referenced in the most recent history and physical.    POSTOPERATIVE DIAGNOSIS:  Morbid obesity with multiple comorbidities as referenced in the most recent history and physical.    PROCEDURES PERFORMED:  1.  Laparoscopic sleeve gastrectomy.  2.  Tisseel application.     SURGEON:  Mick Posadas Jr., MD    ASSISTANT:  BRAYAN Brunner, [facility]    Surgery assisted and facilitated by a certified physician assistant, who directly resulted in a decreased operative time, anesthetic time, wound exposure, and possibly of an operative wound infection, thereby decreasing patient morbidity and ultimately total expenditures.  The surgical assistant assisted in placement of trochars, take down of the gastrocolic omentum, short gastric vessels and dissection at the angle of His.  Also assisted in retraction of the stomach during stapling so as not to kink the gastric sleeve.  Also assisted in removing of the gastric specimen, closure of the fascial defect as well as closure of the skin incisions.    ANESTHESIA:  General endotracheal.    ESTIMATED BLOOD LOSS:   Less than 25 mL unless dictated below.    FLUIDS:  Crystalloids.    SPECIMENS:  Gastric remnant    DRAINS:  None.    COUNTS:  Correct.    COMPLICATIONS:  None.    INDICATIONS:  This patient with morbid obesity and associated comorbidities presents for elective laparoscopic, possible open sleeve gastrectomy.  The patient has received medical clearance to proceed.  The patient has undergone our extensive educational process and consent process and wishes to proceed.    DESCRIPTION OF PROCEDURE:  The patient was brought to the operating room and placed supine upon the operating room table. SCD hose were placed.  The patient underwent uneventful general endotracheal anesthesia per the anesthesiology staff. The abdomen was prepped with ChloraPrep and draped in the usual sterile fashion.  An Ioban was  used as well if not allergic. Anesthesia staff then passed a 36-[place] ViSiGi bougie into the stomach to decompress and then was pulled back to the mouth.    A 5-10 mm transverse incision was made a few centimeters above and to the left of the umbilicus and the peritoneal cavity entered under direct camera visualization using a 5 or 10 mm 0° laparoscope and an Optiview trocar.  The abdomen was then insufflated to a pressure of 15-16 mmHg with CO2 gas.  Exploratory laparoscopy revealed no evidence of injury from the entrance technique and no significant abnormalities unless addendum dictated below.  An angled laparoscope was then used.  The patient was placed in reverse Trendelenburg position.  Under direct camera visualization a 5 mm trocar was placed in the right lateral subcostal position.  A 12 mm trocar was placed in the right midabdominal position.  A 5 mm trocar was placed in the left midabdominal position. A Vita retractor was placed through an epigastric incision and used to elevate the left lobe of the liver.  The fat pad was elevated and the left lc exposed.  At this point, approximately shelter along the greater curvature, the gastrocolic omentum was divided with the Enseal and this proceeded superiorly to the angle of His taking down the short gastric vessels.  All posterior attachments of the lesser sac and posterior aspect of the stomach to the pancreas were taken down as well.  The left lc was exposed along its length.  Dissection then proceeded medially taking down the greater curvature with an Enseal until just proximal to the pylorus.  The 36-[place] ViSiGi bougie was passed back down into the stomach by anesthesia and the sleeve gastrectomy was then performed.  The 1st load was a black, thick tissue load on the [place] Flex stapler with Veritas Kathleen-Strip and this was placed 3-4 cm proximal to the pylorus and up against the bougie pulling it anteriorly and posteriorly up against the bougie  but paying close attention not to narrow the incisura.  The next 5-7 loads were green with absorbable Veritas Kathleen-Strips depending on the size of the stomach. Careful attention was made to stay about 1 cm from the esophagus. Areas of the reinforced staple line were oversewn with absorbable sutures as needed for bleeding or questionable staple lines.  At this point, the gastrectomy specimen was withdrawn through the 12 mm trocar site incision. The specimen was then opened up on a back table and the staple line was inspected and was intact.  If no abnormalities were seen in the specimen the gastric specimen was then discarded.  If abnormalities were seen the specimen was then sent off to pathology.  At this point, the sleeve was submerged under saline and using the ViSiGi bougie to insufflate the stomach, a leak test was performed.  This revealed the sleeve to be watertight, no air bubbles, no leak, and no bleeding seen from the staple lines and no significant abnormalities.  Irrigation fluid from the abdomen was then suctioned.  The gastric sleeve staple line was then treated with 4 mL Tisseel fibrin glue. The fascia at the 12 mm trocar site incision was closed with a single 0 Vicryl suture in a figure-of-eight fashion placed under direct laparoscopic camera visualization with a suture passer and tying the knot extracorporeally.  The fascia in the area was infiltrated with local anesthesia. All incisions were then infiltrated with local anesthetic. The remaining trocars were removed under direct camera visualization with no bleeding noted from their sites.  The abdomen was desufflated of gas. The skin in each incision was closed using 4-0 antibiotic impregnated Monocryl in a subcuticular fashion followed by Dermabond.  The patient tolerated the procedure well without complication and was taken to the recovery room in stable condition.  All sponge, needle and instrument counts were correct.     The hiatus was checked  for a hernia and no hernia was detected.

## 2018-06-12 LAB
ALBUMIN SERPL-MCNC: 3.5 G/DL (ref 3.5–5.2)
ALBUMIN/GLOB SERPL: 1 G/DL
ALP SERPL-CCNC: 77 U/L (ref 39–117)
ALT SERPL W P-5'-P-CCNC: 18 U/L (ref 1–33)
ANION GAP SERPL CALCULATED.3IONS-SCNC: 11.3 MMOL/L
AST SERPL-CCNC: 20 U/L (ref 1–32)
BASOPHILS # BLD AUTO: 0.02 10*3/MM3 (ref 0–0.2)
BASOPHILS NFR BLD AUTO: 0.1 % (ref 0–1.5)
BILIRUB SERPL-MCNC: 0.5 MG/DL (ref 0.1–1.2)
BUN BLD-MCNC: 6 MG/DL (ref 6–20)
BUN/CREAT SERPL: 6.7 (ref 7–25)
CALCIUM SPEC-SCNC: 8.6 MG/DL (ref 8.6–10.5)
CHLORIDE SERPL-SCNC: 102 MMOL/L (ref 98–107)
CO2 SERPL-SCNC: 25.7 MMOL/L (ref 22–29)
CREAT BLD-MCNC: 0.9 MG/DL (ref 0.57–1)
CYTO UR: NORMAL
DEPRECATED RDW RBC AUTO: 44.9 FL (ref 37–54)
EOSINOPHIL # BLD AUTO: 0.01 10*3/MM3 (ref 0–0.7)
EOSINOPHIL NFR BLD AUTO: 0 % (ref 0.3–6.2)
ERYTHROCYTE [DISTWIDTH] IN BLOOD BY AUTOMATED COUNT: 13.1 % (ref 11.7–13)
GFR SERPL CREATININE-BSD FRML MDRD: 67 ML/MIN/1.73
GLOBULIN UR ELPH-MCNC: 3.5 GM/DL
GLUCOSE BLD-MCNC: 117 MG/DL (ref 65–99)
HCT VFR BLD AUTO: 41.2 % (ref 35.6–45.5)
HGB BLD-MCNC: 14.3 G/DL (ref 11.9–15.5)
IMM GRANULOCYTES # BLD: 0.18 10*3/MM3 (ref 0–0.03)
IMM GRANULOCYTES NFR BLD: 0.5 % (ref 0–0.5)
LAB AP CASE REPORT: NORMAL
LYMPHOCYTES # BLD AUTO: 3.8 10*3/MM3 (ref 0.9–4.8)
LYMPHOCYTES NFR BLD AUTO: 11.1 % (ref 19.6–45.3)
Lab: NORMAL
MAGNESIUM SERPL-MCNC: 2.2 MG/DL (ref 1.6–2.6)
MCH RBC QN AUTO: 33.4 PG (ref 26.9–32)
MCHC RBC AUTO-ENTMCNC: 34.7 G/DL (ref 32.4–36.3)
MCV RBC AUTO: 96.3 FL (ref 80.5–98.2)
MONOCYTES # BLD AUTO: 3.24 10*3/MM3 (ref 0.2–1.2)
MONOCYTES NFR BLD AUTO: 9.4 % (ref 5–12)
NEUTROPHILS # BLD AUTO: 27.24 10*3/MM3 (ref 1.9–8.1)
NEUTROPHILS NFR BLD AUTO: 79.4 % (ref 42.7–76)
PATH REPORT.FINAL DX SPEC: NORMAL
PATH REPORT.GROSS SPEC: NORMAL
PHOSPHATE SERPL-MCNC: 2.3 MG/DL (ref 2.5–4.5)
PLAT MORPH BLD: NORMAL
PLATELET # BLD AUTO: 447 10*3/MM3 (ref 140–500)
PMV BLD AUTO: 11.7 FL (ref 6–12)
POTASSIUM BLD-SCNC: 3.9 MMOL/L (ref 3.5–5.2)
PROT SERPL-MCNC: 7 G/DL (ref 6–8.5)
RBC # BLD AUTO: 4.28 10*6/MM3 (ref 3.9–5.2)
RBC MORPH BLD: NORMAL
SODIUM BLD-SCNC: 139 MMOL/L (ref 136–145)
WBC MORPH BLD: NORMAL
WBC NRBC COR # BLD: 34.31 10*3/MM3 (ref 4.5–10.7)

## 2018-06-12 PROCEDURE — 25010000002 ENOXAPARIN PER 10 MG: Performed by: SURGERY

## 2018-06-12 PROCEDURE — 94799 UNLISTED PULMONARY SVC/PX: CPT

## 2018-06-12 PROCEDURE — 25010000002 CYANOCOBALAMIN PER 1000 MCG: Performed by: SURGERY

## 2018-06-12 PROCEDURE — 25010000002 ONDANSETRON PER 1 MG: Performed by: SURGERY

## 2018-06-12 PROCEDURE — 84100 ASSAY OF PHOSPHORUS: CPT | Performed by: SURGERY

## 2018-06-12 PROCEDURE — 83735 ASSAY OF MAGNESIUM: CPT | Performed by: SURGERY

## 2018-06-12 PROCEDURE — 25010000002 THIAMINE PER 100 MG: Performed by: SURGERY

## 2018-06-12 PROCEDURE — 25010000002 MORPHINE PER 10 MG: Performed by: SURGERY

## 2018-06-12 PROCEDURE — 85025 COMPLETE CBC W/AUTO DIFF WBC: CPT | Performed by: SURGERY

## 2018-06-12 PROCEDURE — 25010000002 METOCLOPRAMIDE PER 10 MG: Performed by: SURGERY

## 2018-06-12 PROCEDURE — 80053 COMPREHEN METABOLIC PANEL: CPT | Performed by: SURGERY

## 2018-06-12 PROCEDURE — 25010000002 PYRIDOXINE PER 100 MG: Performed by: SURGERY

## 2018-06-12 PROCEDURE — 85007 BL SMEAR W/DIFF WBC COUNT: CPT | Performed by: SURGERY

## 2018-06-12 RX ORDER — NALOXONE HCL 0.4 MG/ML
0.1 VIAL (ML) INJECTION
Status: DISCONTINUED | OUTPATIENT
Start: 2018-06-12 | End: 2018-06-13 | Stop reason: HOSPADM

## 2018-06-12 RX ORDER — CALCIUM CARBONATE 500(1250)
500 TABLET ORAL DAILY
COMMUNITY
End: 2021-08-23

## 2018-06-12 RX ADMIN — HYDROCODONE BITARTRATE AND ACETAMINOPHEN 15 ML: 7.5; 325 SOLUTION ORAL at 20:06

## 2018-06-12 RX ADMIN — LAMOTRIGINE 300 MG: 300 TABLET, EXTENDED RELEASE ORAL at 20:06

## 2018-06-12 RX ADMIN — HYOSCYAMINE SULFATE 125 MCG: 0.12 TABLET ORAL at 09:18

## 2018-06-12 RX ADMIN — METOCLOPRAMIDE 10 MG: 5 INJECTION, SOLUTION INTRAMUSCULAR; INTRAVENOUS at 14:37

## 2018-06-12 RX ADMIN — HYDROMORPHONE HYDROCHLORIDE 2 MG: 2 TABLET ORAL at 14:35

## 2018-06-12 RX ADMIN — POTASSIUM PHOSPHATE, MONOBASIC AND POTASSIUM PHOSPHATE, DIBASIC 15 MMOL: 224; 236 INJECTION, SOLUTION, CONCENTRATE INTRAVENOUS at 17:30

## 2018-06-12 RX ADMIN — HYOSCYAMINE SULFATE 125 MCG: 0.12 TABLET ORAL at 20:07

## 2018-06-12 RX ADMIN — SODIUM CHLORIDE, POTASSIUM CHLORIDE, SODIUM LACTATE AND CALCIUM CHLORIDE 150 ML/HR: 600; 310; 30; 20 INJECTION, SOLUTION INTRAVENOUS at 22:34

## 2018-06-12 RX ADMIN — SODIUM CHLORIDE, POTASSIUM CHLORIDE, SODIUM LACTATE AND CALCIUM CHLORIDE 150 ML/HR: 600; 310; 30; 20 INJECTION, SOLUTION INTRAVENOUS at 03:53

## 2018-06-12 RX ADMIN — MORPHINE SULFATE 2 MG: 2 INJECTION, SOLUTION INTRAMUSCULAR; INTRAVENOUS at 12:36

## 2018-06-12 RX ADMIN — CYANOCOBALAMIN 1000 MCG: 1000 INJECTION, SOLUTION INTRAMUSCULAR at 09:17

## 2018-06-12 RX ADMIN — METOCLOPRAMIDE 10 MG: 5 INJECTION, SOLUTION INTRAMUSCULAR; INTRAVENOUS at 09:18

## 2018-06-12 RX ADMIN — SODIUM CHLORIDE, POTASSIUM CHLORIDE, SODIUM LACTATE AND CALCIUM CHLORIDE 150 ML/HR: 600; 310; 30; 20 INJECTION, SOLUTION INTRAVENOUS at 12:11

## 2018-06-12 RX ADMIN — ALBUTEROL SULFATE 2.5 MG: 2.5 SOLUTION RESPIRATORY (INHALATION) at 19:19

## 2018-06-12 RX ADMIN — HYDROCODONE BITARTRATE AND ACETAMINOPHEN 15 ML: 7.5; 325 SOLUTION ORAL at 09:17

## 2018-06-12 RX ADMIN — FAMOTIDINE 20 MG: 10 INJECTION INTRAVENOUS at 20:06

## 2018-06-12 RX ADMIN — ENOXAPARIN SODIUM 40 MG: 100 INJECTION SUBCUTANEOUS at 09:18

## 2018-06-12 RX ADMIN — HYOSCYAMINE SULFATE 125 MCG: 0.12 TABLET ORAL at 17:30

## 2018-06-12 RX ADMIN — ALBUTEROL SULFATE 2.5 MG: 2.5 SOLUTION RESPIRATORY (INHALATION) at 08:07

## 2018-06-12 RX ADMIN — ONDANSETRON 4 MG: 2 INJECTION INTRAMUSCULAR; INTRAVENOUS at 17:35

## 2018-06-12 RX ADMIN — HYOSCYAMINE SULFATE 125 MCG: 0.12 TABLET ORAL at 12:12

## 2018-06-12 RX ADMIN — METOCLOPRAMIDE 10 MG: 5 INJECTION, SOLUTION INTRAMUSCULAR; INTRAVENOUS at 20:08

## 2018-06-12 RX ADMIN — MORPHINE SULFATE 2 MG: 2 INJECTION, SOLUTION INTRAMUSCULAR; INTRAVENOUS at 09:26

## 2018-06-12 RX ADMIN — FAMOTIDINE 20 MG: 10 INJECTION INTRAVENOUS at 09:20

## 2018-06-12 RX ADMIN — ONDANSETRON 4 MG: 2 INJECTION INTRAMUSCULAR; INTRAVENOUS at 09:27

## 2018-06-12 RX ADMIN — HYDROCODONE BITARTRATE AND ACETAMINOPHEN 15 ML: 7.5; 325 SOLUTION ORAL at 02:34

## 2018-06-12 RX ADMIN — METOCLOPRAMIDE 10 MG: 5 INJECTION, SOLUTION INTRAMUSCULAR; INTRAVENOUS at 03:54

## 2018-06-12 RX ADMIN — MORPHINE SULFATE 2 MG: 2 INJECTION, SOLUTION INTRAMUSCULAR; INTRAVENOUS at 17:30

## 2018-06-12 NOTE — PROGRESS NOTES
Clinical Pharmacy Services: Medication History    Karen Alarcon is a 47 y.o. female presenting to [facility] for Severe obesity (BMI 35.0-39.9) [E66.01]  Obesity, Class II, BMI 35-39.9 [E66.9]    She  has a past medical history of Anxiety and depression; Blood disorder; Hereditary spherocytosis (9/11/2017); History of herpes genitalis; Hypertension; Seizure disorder; Sleep apnea; and Stress incontinence.    Allergies as of 05/16/2018   • (No Known Allergies)       Medication information was obtained from: Patient's home medication bottles and pt's pharmacy  Pharmacy and Phone Number: [phone] [address] - [phone]  - [phone] FX    Prior to Admission Medications     Prescriptions Last Dose Informant Patient Reported? Taking?    apixaban (ELIQUIS) 2.5 MG tablet tablet  Medication Bottle No No    Take 1 tablet by mouth Every 12 (Twelve) Hours for 30 days. TO BEGIN AFTER SURGERY    Calcium 500 MG tablet  Self Yes Yes    Take 500 mg by mouth Daily.    estradiol (DIVIGEL) 1 MG/GM gel 6/9/2018 Pharmacy Yes No    Place 1 application on the skin Daily.    folic acid (FOLVITE) 1 MG tablet 6/10/2018 Pharmacy No Yes    Take 1 tablet by mouth Daily. Begin two weeks prior to surgery    LamoTRIgine  MG tablet sustained-release 24 hour 6/10/2018 Medication Bottle Yes Yes    Take 300 mg by mouth Every Night.    lansoprazole (PREVACID) 15 MG capsule  Medication Bottle Yes No    Take 15 mg by mouth Every Night.    Multiple Vitamins-Minerals (MULTIVITAMIN ADULT PO) 6/10/2018 Self Yes Yes    Take 1 tablet by mouth Daily.    nitrofurantoin, macrocrystal-monohydrate, (MACROBID) 100 MG capsule 6/10/2018 Medication Bottle Yes Yes    Take 100 mg by mouth Every Night.    valACYclovir (VALTREX) 1000 MG tablet 6/10/2018 Medication Bottle Yes Yes    Take 1,000 mg by mouth Every Night.    vilazodone (VIIBRYD) 40 MG tablet tablet 6/10/2018 Medication Bottle Yes Yes    Take  40 mg by mouth Every Night.            Medication notes:     This medication list is complete to the best of my knowledge as of 6/12/2018    Please call pharmacy for any questions.     Yanelis Snyder PharmD, BCPS  06/12/18 2:18 PM

## 2018-06-12 NOTE — PROGRESS NOTES
"Subjective:       Karen Alarcon  is post op day one status post procedure listed. Patient denies shortness of air and lower extremity pain. C/O nausea and vomiting this am after taking hycet for pain control. Ambulating well and using incentive spirometer.       Objective:        /79 (BP Location: Left arm, Patient Position: Lying)   Pulse 52   Temp 97.6 °F (36.4 °C) (Oral)   Resp 16   Ht 170.2 cm (67\")   Wt 106 kg (233 lb 9.6 oz)   SpO2 94%   BMI 36.59 kg/m²     General:  alert, appears stated age, cooperative and no distress   Abdomen: soft, bowel sounds active, appropriate tenderness   Incision:   healing well, no drainage, no erythema, no hernia, no seroma, no swelling, no dehiscence, incision well approximated   Heart: Regular rate   Lungs: Clear to auscultation bilaterally     I reviewed the patient's new clinical results.     Lab Results (last 24 hours)     Procedure Component Value Units Date/Time    CBC & Differential [[id number]] Collected:  06/12/18 0453    Specimen:  Blood Updated:  06/12/18 0644    Narrative:       The following orders were created for panel order CBC & Differential.  Procedure                               Abnormality         Status                     ---------                               -----------         ------                     Scan Slide[[id number]]                   Normal              Final result               CBC Auto Differential[[id number]]        Abnormal            Final result                 Please view results for these tests on the individual orders.    CBC Auto Differential [[id number]]  (Abnormal) Collected:  06/12/18 0453    Specimen:  Blood Updated:  06/12/18 0644     WBC 34.31 (C) 10*3/mm3      RBC 4.28 10*6/mm3      Hemoglobin 14.3 g/dL      Hematocrit 41.2 %      MCV 96.3 fL      MCH 33.4 (H) pg      MCHC 34.7 g/dL      RDW 13.1 (H) %      RDW-SD 44.9 fl      MPV 11.7 fL      Platelets 447 10*3/mm3      Neutrophil % 79.4 (H) %      Lymphocyte % " 11.1 (L) %      Monocyte % 9.4 %      Eosinophil % 0.0 (L) %      Basophil % 0.1 %      Immature Grans % 0.5 %      Neutrophils, Absolute 27.24 (H) 10*3/mm3      Lymphocytes, Absolute 3.80 10*3/mm3      Monocytes, Absolute 3.24 (H) 10*3/mm3      Eosinophils, Absolute 0.01 10*3/mm3      Basophils, Absolute 0.02 10*3/mm3      Immature Grans, Absolute 0.18 (H) 10*3/mm3     Scan Slide [[id number]]  (Normal) Collected:  06/12/18 0453    Specimen:  Blood Updated:  06/12/18 0644     RBC Morphology Normal     WBC Morphology Normal     Platelet Morphology Normal    Comprehensive Metabolic Panel [[id number]]  (Abnormal) Collected:  06/12/18 0453    Specimen:  Blood Updated:  06/12/18 0545     Glucose 117 (H) mg/dL      BUN 6 mg/dL      Creatinine 0.90 mg/dL      Sodium 139 mmol/L      Potassium 3.9 mmol/L      Chloride 102 mmol/L      CO2 25.7 mmol/L      Calcium 8.6 mg/dL      Total Protein 7.0 g/dL      Albumin 3.50 g/dL      ALT (SGPT) 18 U/L      AST (SGOT) 20 U/L      Alkaline Phosphatase 77 U/L      Total Bilirubin 0.5 mg/dL      eGFR Non African Amer 67 mL/min/1.73      Globulin 3.5 gm/dL      A/G Ratio 1.0 g/dL      BUN/Creatinine Ratio 6.7 (L)     Anion Gap 11.3 mmol/L     Phosphorus [[id number]]  (Abnormal) Collected:  06/12/18 0453    Specimen:  Blood Updated:  06/12/18 0541     Phosphorus 2.3 (L) mg/dL     Magnesium [[id number]]  (Normal) Collected:  06/12/18 0453    Specimen:  Blood Updated:  06/12/18 0541     Magnesium 2.2 mg/dL     Tissue Pathology Exam [[id number]] Collected:  06/11/18 0704    Specimen:  Tissue from Stomach Updated:  06/11/18 1001             Assessment:      Doing well postoperatively other than post-operative nausea/vomiting.      Plan:   1. Hold diet for now  2. Continue with ambulation and Incentive spirometry  3. Plan for d/c home tomorrow if patient feels better and WBC improved (asplenic)  4. Lovenox as ordered  5. Continue with anti-emetics    Patient was seen and examined by Dr. Posadas.

## 2018-06-12 NOTE — PLAN OF CARE
Problem: Patient Care Overview  Goal: Plan of Care Review  Outcome: Ongoing (interventions implemented as appropriate)   06/11/18 1849 06/12/18 0031   Coping/Psychosocial   Plan of Care Reviewed With --  patient   Plan of Care Review   Progress improving --    OTHER   Outcome Summary --  Pt complains of mild pain and PRN painm meds given. No complaints of nausea. Pt has ambulated x4 this shift. Pt taking sips of clear liquids. IV fluids. VSS. Continue to monitor. Safety maintained.        Problem: Bariatric Surgery (Adult,Pediatric)  Goal: Signs and Symptoms of Listed Potential Problems Will be Absent, Minimized or Managed (Bariatric Surgery)  Outcome: Ongoing (interventions implemented as appropriate)      Problem: Pain, Acute (Adult)  Goal: Identify Related Risk Factors and Signs and Symptoms  Outcome: Outcome(s) achieved Date Met: 06/12/18    Goal: Acceptable Pain Control/Comfort Level  Outcome: Ongoing (interventions implemented as appropriate)

## 2018-06-12 NOTE — PLAN OF CARE
Problem: Patient Care Overview  Goal: Plan of Care Review  Outcome: Ongoing (interventions implemented as appropriate)   06/12/18 9898   Coping/Psychosocial   Plan of Care Reviewed With patient   Plan of Care Review   Progress improving   OTHER   Outcome Summary Pt still complaining of pain/intermittent nausea; medicated PRN; encouraged activity; ambulated independently w/out difficulty; VSS; using IS; KPhos replaced per protocol       Problem: Bariatric Surgery (Adult,Pediatric)  Goal: Signs and Symptoms of Listed Potential Problems Will be Absent, Minimized or Managed (Bariatric Surgery)  Outcome: Ongoing (interventions implemented as appropriate)      Problem: Pain, Acute (Adult)  Goal: Acceptable Pain Control/Comfort Level  Outcome: Ongoing (interventions implemented as appropriate)

## 2018-06-13 VITALS
SYSTOLIC BLOOD PRESSURE: 128 MMHG | HEART RATE: 64 BPM | RESPIRATION RATE: 18 BRPM | WEIGHT: 231.2 LBS | TEMPERATURE: 98 F | DIASTOLIC BLOOD PRESSURE: 88 MMHG | HEIGHT: 67 IN | OXYGEN SATURATION: 92 % | BODY MASS INDEX: 36.29 KG/M2

## 2018-06-13 LAB
ANION GAP SERPL CALCULATED.3IONS-SCNC: 10.3 MMOL/L
BASOPHILS # BLD AUTO: 0.04 10*3/MM3 (ref 0–0.2)
BASOPHILS NFR BLD AUTO: 0.2 % (ref 0–1.5)
BUN BLD-MCNC: 5 MG/DL (ref 6–20)
BUN/CREAT SERPL: 5.7 (ref 7–25)
CALCIUM SPEC-SCNC: 9.1 MG/DL (ref 8.6–10.5)
CHLORIDE SERPL-SCNC: 100 MMOL/L (ref 98–107)
CO2 SERPL-SCNC: 27.7 MMOL/L (ref 22–29)
CREAT BLD-MCNC: 0.87 MG/DL (ref 0.57–1)
DEPRECATED RDW RBC AUTO: 47.5 FL (ref 37–54)
EOSINOPHIL # BLD AUTO: 1.84 10*3/MM3 (ref 0–0.7)
EOSINOPHIL NFR BLD AUTO: 7.5 % (ref 0.3–6.2)
ERYTHROCYTE [DISTWIDTH] IN BLOOD BY AUTOMATED COUNT: 13.2 % (ref 11.7–13)
GFR SERPL CREATININE-BSD FRML MDRD: 70 ML/MIN/1.73
GLUCOSE BLD-MCNC: 105 MG/DL (ref 65–99)
HCT VFR BLD AUTO: 42.2 % (ref 35.6–45.5)
HGB BLD-MCNC: 13.9 G/DL (ref 11.9–15.5)
IMM GRANULOCYTES # BLD: 0.15 10*3/MM3 (ref 0–0.03)
IMM GRANULOCYTES NFR BLD: 0.6 % (ref 0–0.5)
LYMPHOCYTES # BLD AUTO: 3.9 10*3/MM3 (ref 0.9–4.8)
LYMPHOCYTES NFR BLD AUTO: 15.9 % (ref 19.6–45.3)
MCH RBC QN AUTO: 32.7 PG (ref 26.9–32)
MCHC RBC AUTO-ENTMCNC: 32.9 G/DL (ref 32.4–36.3)
MCV RBC AUTO: 99.3 FL (ref 80.5–98.2)
MONOCYTES # BLD AUTO: 3.36 10*3/MM3 (ref 0.2–1.2)
MONOCYTES NFR BLD AUTO: 13.7 % (ref 5–12)
NEUTROPHILS # BLD AUTO: 15.23 10*3/MM3 (ref 1.9–8.1)
NEUTROPHILS NFR BLD AUTO: 62.1 % (ref 42.7–76)
PLATELET # BLD AUTO: 425 10*3/MM3 (ref 140–500)
PMV BLD AUTO: 11.7 FL (ref 6–12)
POTASSIUM BLD-SCNC: 3.7 MMOL/L (ref 3.5–5.2)
RBC # BLD AUTO: 4.25 10*6/MM3 (ref 3.9–5.2)
SODIUM BLD-SCNC: 138 MMOL/L (ref 136–145)
WBC NRBC COR # BLD: 24.52 10*3/MM3 (ref 4.5–10.7)

## 2018-06-13 PROCEDURE — 94799 UNLISTED PULMONARY SVC/PX: CPT

## 2018-06-13 PROCEDURE — 25010000002 ONDANSETRON PER 1 MG: Performed by: SURGERY

## 2018-06-13 PROCEDURE — 80048 BASIC METABOLIC PNL TOTAL CA: CPT | Performed by: NURSE PRACTITIONER

## 2018-06-13 PROCEDURE — 25010000002 METOCLOPRAMIDE PER 10 MG: Performed by: SURGERY

## 2018-06-13 PROCEDURE — 85025 COMPLETE CBC W/AUTO DIFF WBC: CPT | Performed by: NURSE PRACTITIONER

## 2018-06-13 RX ORDER — ONDANSETRON 4 MG/1
4 TABLET, FILM COATED ORAL EVERY 8 HOURS PRN
Qty: 25 TABLET | Refills: 0 | Status: SHIPPED | OUTPATIENT
Start: 2018-06-13 | End: 2018-09-14

## 2018-06-13 RX ORDER — METOCLOPRAMIDE 10 MG/1
10 TABLET ORAL
Qty: 120 TABLET | Refills: 0 | Status: SHIPPED | OUTPATIENT
Start: 2018-06-13 | End: 2018-09-14

## 2018-06-13 RX ADMIN — ALBUTEROL SULFATE 2.5 MG: 2.5 SOLUTION RESPIRATORY (INHALATION) at 06:30

## 2018-06-13 RX ADMIN — HYDROCODONE BITARTRATE AND ACETAMINOPHEN 15 ML: 7.5; 325 SOLUTION ORAL at 04:50

## 2018-06-13 RX ADMIN — ONDANSETRON 4 MG: 2 INJECTION INTRAMUSCULAR; INTRAVENOUS at 03:11

## 2018-06-13 RX ADMIN — METOCLOPRAMIDE 10 MG: 5 INJECTION, SOLUTION INTRAMUSCULAR; INTRAVENOUS at 03:11

## 2018-06-13 RX ADMIN — SODIUM CHLORIDE, POTASSIUM CHLORIDE, SODIUM LACTATE AND CALCIUM CHLORIDE 150 ML/HR: 600; 310; 30; 20 INJECTION, SOLUTION INTRAVENOUS at 05:58

## 2018-06-13 NOTE — PLAN OF CARE
Problem: Patient Care Overview  Goal: Plan of Care Review  Outcome: Ongoing (interventions implemented as appropriate)   06/13/18 0041   Coping/Psychosocial   Plan of Care Reviewed With patient   Plan of Care Review   Progress improving   OTHER   Outcome Summary Pt complains of minimal pain. PRN meds given. Pt has ambulated multiple times in the [place] throughout the night. VSS. Continue to monitor. Safety maintained.        Problem: Bariatric Surgery (Adult,Pediatric)  Goal: Signs and Symptoms of Listed Potential Problems Will be Absent, Minimized or Managed (Bariatric Surgery)  Outcome: Ongoing (interventions implemented as appropriate)      Problem: Pain, Acute (Adult)  Goal: Acceptable Pain Control/Comfort Level  Outcome: Ongoing (interventions implemented as appropriate)

## 2018-06-13 NOTE — DISCHARGE INSTRUCTIONS
GOING HOME AFTER GASTRIC SLEEVE/ GASTRIC BYPASS SURGERY  [facility] Weight Loss: Post-Operative Information/Instructions  Mick Posadas Jr., MD  General Patient Instructions for Discharge   - Call Surgeon's office at [phone] for follow-up appointment.    - Be sure you, the patient, have a follow-up appointment to be seen within three (3) days after discharge. If not, please call [phone] to schedule an appointment. If you are discharged on a Saturday or Sunday, please call Monday to schedule the appointment.  - Contact the Surgeon at [phone] for any questions or concerns, including temperature greater than or equal to 101F, shortness of breath, leg swelling, redness at incision sites, nausea, vomiting, chills, or problems or questions.    - Follow the Gastric Stage 1 Diet    à Clear liquids, room temperature, sugar-free, caffeine-free, non-carbonated, 70 grams of protein, No Straws.  - You may shower. No tub bath for 2 weeks.  - No lifting, pushing, pulling, or tugging >25 pounds for 3 weeks.  - Ambulate 4 x per day minimum, increase distance daily.  - For the next several weeks, you are at an increased risk for blood clot formation. Therefore, you should walk regularly. You should not sit for prolonged periods of time, more than 45 minutes, without getting up and walking for 5-10 minutes. This includes any car rides, including the drive home from the hospital. If driving any distance greater than 30 miles over the next two (2) weeks, stop every 30-45 minutes and walk for 5-10 minutes each time.  - Continue using Incentive Spirometer and coughing exercises at least every two (2) hours while awake for one week.  - If you, the patient, use CPAP/BIPAP for diagnosis of sleep apnea, you may resume use tonight at home.  - No driving or operating machinery allowed while taking narcotic (prescription) pain medication, and until you feel comfortable forcefully applying the brakes if needed. (This  usually takes more than 3 days.)    - Make an appointment with your Primary Care Physician within one week post-op to look at your home medications for possible changes or discontinuity.   Medications  - The nurse will provide a list of medications for you to continue at home   - If you received a Lovenox (Enoxaparin) or Apixiban (Eliquis) prescription at pre-op visit with Surgeon, start taking the medicine the morning after discharge unless directed otherwise.    - If you were prescribed Lovenox (Enoxaparin), review the education/teaching material/video with the nurse.    - Take post op pain meds as prescribed as needed.   - Continue Foltx until finished.   - Start Actigall (Ursodiol) one (1) week after surgery if patient still has gallbladder. You should have been given a prescription at your pre-op visit. Contact the office if you do not have the prescription.   - Start bariatric vitamin regimen as instructed in pre-op education with bariatric coordinator.    - Zegerid or Prilosec OTC (or generic) by mouth once daily for four (4) weeks unless you are already taking a proton pump inhibitor as home medication. Follow dosing instructions on package.   Nausea/Vomiting:  The following are possible causes for nausea/vomiting:  - Drinking too much or too fast.  - Sinus drainage/post nasal drip for allergy sufferers (you may take Sudafed, Claritin, Tylenol Sinus/Allergy, or other decongestants and nose sprays to help with this discomfort).  - Low blood sugar (sweating, shaky, irritable, weakness, dizzy or tunnel-vision) - treatment is to sip 100% fruit juice - no sugar added until symptoms subside.  - Acid in fruit juice - (may dilute with water or avoid).  - Eating or drinking something that is not on clear liquid (stage 1) diet.  Any nausea/vomiting that prohibits you from keeping fluids down for greater than 24 hours requires a call to the surgeon's office.  Urine:  Use your urine color as a guide to determine if you  are drinking enough fluid. The darker the urine, the more fluids you need to drink. Urine should be clear to light yellow if you are getting enough fluid. If you should experience frequency, burning or pain with urination, blood in urine, contact us or your primary care physician for possible UTI (urinary tract infection), which could require antibiotics (liquid preferred).  Bowel Movements:  You may not have a bowel movement for 2-5 days after going home. You may then experience liquid, runny or loose stools for approximately 3-4 weeks following surgery. This would require you to drink even more fluids to prevent dehydration. Some patients may experience constipation, which can be treated with increased fluids, drinking warm liquids, increased activity and the use of a Fleets Enema, Milk of Magnesia, or suppositories. The first couple of bowel movements could be bloody, tarry black or dark maroon in color. This is OK as long as the stool returns to a normal color in 1-2 days. If however, you have frequent or a large amount of bloody or tarry black stools and/or become light-headed or dizzy, you may be bleeding and require urgent attention. Please call us right away.  Abdominal Incisions:  You will have small incisions. Do not scrub incisions, but allow the warm, soapy water to run over the incisions, rinse well, and pat dry. You may use any brand of anti-bacterial soap. Do not use Peroxide or Neosporin type ointments on sites, unless instructed to do so by a surgeon or nurse. Monitor daily for signs/symptoms of infection, which might include: drainage with a foul odor, pain, redness, swelling or heat at the incision sight; fever, body aches and chills. If you suspect infection or have a fever, give us a call.  Pain:  You will be given a prescription for pain medication to control your pain. If you feel the dose is too strong, you may take half the ordered dose, or you may take Tylenol adult liquid per package  instructions for minor pain. Do not take any medications that contain aspirin or aspirin products.  Do not take medications like: Motrin, Aleve, Ibuprofen, Advil, Naproxen, Celebrex, Daypro, Bextra, Meloxicam or other medications commonly used for arthritis or joint pain.  No steroids or cortisone injections. There may be pain, which should improve every few days. Pain should not suddenly get worse or more intense. Pain that suddenly changes and is constant and severe should be called in to the surgeon's office. Any sudden pain in the lower extremities with associated warmth and redness should be called in to the surgeon's office immediately. Do not rub or massage this area, as it could be a blood clot.  Diet:  Remain on the clear liquid diet (stage 1) per your  which includes 70 grams of protein each day, sugar free, non carbonated and no straws. Day 1 is the day of surgery. If you are tolerating the stage 1 diet, you may then proceed to stage 2 diet, as instructed in the . Do not progress to the stage 2 diet if you are having nausea/vomiting. Refer to the Basic Nutrition and Food Principles guide.  Medications:  The nurse will let you know which medications you will need to continue once you go home. Do not take any medications that are extended or time released if you had the gastric bypass procedure, OK to take if you had the gastric sleeve procedure. Large capsules can be opened and diluted with clear liquids. Check with your physician or pharmacist as to which pills may be crushed and which capsules may be opened and diluted safely. Continue taking Foltx as surgeon orders. If you still have your gall bladder and were prescribed Actigall (Ursodiol), you may start this medication one week after your surgery. You will remain on Actigall (Ursodiol) for approximately 6 months. The dose is 1 pill, 2 times each day for 6 months.  Activity:  Continue your deep breathing and coughing  exercises with your Incentive Spirometer breathing device at least every 2 hours while awake (10 repetitions each time) for one week. May use CPAP. This will help to prevent respiratory problems such as pneumonia. No lifting, pulling or tugging anything over 25 pounds for 3 weeks after surgery. You may shower but no tub baths, hot tubs or swimming for 2 weeks. Moderate walking is recommended every 2 hours and at least 4 times per day minimum, increase distance daily. Further exercise will be discussed at the first post-op visit. No driving or operating machinery allow until off narcotic pain medication and until you feel comfortable forcefully applying the brakes (usually takes 3 or more days). For the next few weeks you are at an increased risk for blood clot formation. Therefore you should walk regularly and you should not sit for prolonged periods of time, more than 45 minutes without getting up and walking for 5-10 minutes. This includes car rides. Including riding home from the hospital. If riding a distance greater than 30 miles over the next 2 weeks stop every 30-45 minutes and walk 5-10 mintues each time. No tanning bed use for 8 weeks after surgery and in general, not recommended due to the increased risk for skin cancer. Incisions will burn/blister very badly with tanning bed use.  Illness:  Your primary care physician should treat general illness such as ear infections, sinus infections, and viral type illnesses, etc. Medications prescribed should be liquid/elixir form when possible, for the first 30 days.  General:  In general, it is recommended that you weigh yourself no more than once per week. Let the weight come off you and concentrate on more important things. Remember the weight was not gained overnight, nor will it be lost overnight. Gastric Bypass/ Gastric Sleeve weight loss will continue over a period of 12-18 months. Do not  yourself according to how others are doing after surgery, as this  will cause unnecessary discouragement.  THE ABOVE ARE GENERAL GUIDELINES TO ASSIST YOU ONCE HOME, IF YOU ARE IN DOUBT, OR YOU HAVE ANY QUESTIONS, CALL US AT THE NUMBERS LISTED BELOW.  IN THE EVENT OF SUDDEN CHEST PAIN, SHORTNESS OF BREATH, OR ANY LIFE THREATENING CONDITION, CALL 911.  Any time you are evaluated or admitted to another facility, please have someone notify the surgeon's office.  Supplements:  70 grams of protein taken EVERY DAY. Remember to drink at least 64 ounces of fluid a day, sipping slowly early on. Increase this amount during the summertime. Sipping slowly will not stretch your new stomach. Drinking too fast or gulping liquids will cause brief discomfort and early could cause staple line disruption (leak). With eating, tiny bites, then chew, chew, chew, and swallow. Lay your fork/spoon down for 2-3 minutes, and then take your next bite. Your pouch will tell you within 1-2 bites if it is going to tolerate what you are eating.   Protein Vendors:  Refer to protein vendors' handout from consult class. You can always find protein drinks at the bariatric office, grocery stores, [employer], drug [employer], [employer], health food stores, and on the Internet. Find one high in protein (15-30 grams per serving) and low carb (less than 18 grams per serving).  Now is a great time to re-read your . Please review specific instructions given to you at discharge by your physician (surgeon).  HOW/WHEN TO CONTACT US:  It is imperative that you contact us with any of the following:    Ÿ fever greater than 101 degrees  Ÿ shortness of breath  Ÿ leg swelling  Ÿ body aches  Ÿ shaking chills  Ÿ nausea and vomitting  Ÿ pain that has worsened  Ÿ redness at incision sites  Ÿ pus or foul smelling drainage from an incision or wound  Ÿ inability to keep fluids down for more than a day  Ÿ any other condition you feel needs our attention.  [facility] Bariatric: [phone] call this number anytime 24 hours  a day / 7 days a week.  Teach-back Questions to be answered by the patient prior to discharge.   What complications would prompt you to call your doctor when you return home? _________________    What is the purpose of your prescribed medication? ________________  What are some potential side effects of the medications you will be taking at home? _______________

## 2018-06-14 ENCOUNTER — OFFICE VISIT (OUTPATIENT)
Dept: BARIATRICS/WEIGHT MGMT | Facility: CLINIC | Age: 48
End: 2018-06-14

## 2018-06-14 VITALS
DIASTOLIC BLOOD PRESSURE: 70 MMHG | TEMPERATURE: 99.2 F | HEIGHT: 67 IN | HEART RATE: 68 BPM | BODY MASS INDEX: 35.47 KG/M2 | WEIGHT: 226 LBS | RESPIRATION RATE: 16 BRPM | SYSTOLIC BLOOD PRESSURE: 114 MMHG

## 2018-06-14 DIAGNOSIS — M25.50 MULTIPLE JOINT PAIN: ICD-10-CM

## 2018-06-14 DIAGNOSIS — R53.82 CHRONIC FATIGUE: ICD-10-CM

## 2018-06-14 DIAGNOSIS — Z87.898 HISTORY OF SEIZURE: ICD-10-CM

## 2018-06-14 DIAGNOSIS — E66.9 OBESITY, CLASS II, BMI 35-39.9: Primary | ICD-10-CM

## 2018-06-14 DIAGNOSIS — I10 ESSENTIAL HYPERTENSION: ICD-10-CM

## 2018-06-14 DIAGNOSIS — G47.33 OSA (OBSTRUCTIVE SLEEP APNEA): ICD-10-CM

## 2018-06-14 PROCEDURE — 99024 POSTOP FOLLOW-UP VISIT: CPT | Performed by: NURSE PRACTITIONER

## 2018-06-14 NOTE — DISCHARGE SUMMARY
"Discharge Summary    Patient name: Karen Alarcon    Medical record number: [id number]    Admission date: 6/11/2018  Discharge date:  6/13/2018    Attending physician: Dr. Mick Posadas    Primary care physician: Anibal Callejas MD    Referring physician: Mick Posadas Jr., MD  [address] [address]    Condition on discharge: Stable    Primary Diagnoses:  Morbid obesity with co-morbidities    Operative Procedure:  Laparoscopic gastric sleeve     Karen Alarcon  is post op day two status post procedure listed. Patient denies shortness of air and lower extremity pain. Feels better than yesterday. No vomiting this am. Ambulating well and using incentive spirometer.          /88 (BP Location: Left arm, Patient Position: Lying)   Pulse 64   Temp 98 °F (36.7 °C) (Oral)   Resp 18   Ht 170.2 cm (67\")   Wt 105 kg (231 lb 3.2 oz)   SpO2 92%   BMI 36.21 kg/m²     General:  alert, appears stated age, cooperative and no distress   Abdomen: soft, bowel sounds active, appropriate tenderness   Incision:   healing well, no drainage, no erythema, no hernia, no seroma, no swelling, no dehiscence, incision well approximated   Heart: Regular rate   Lungs: Clear to auscultation bilaterally     I reviewed the patient's new clinical results.     Lab Results (last 24 hours)     Procedure Component Value Units Date/Time    Basic Metabolic Panel [[id number]]  (Abnormal) Collected:  06/13/18 0556    Specimen:  Blood Updated:  06/13/18 0722     Glucose 105 (H) mg/dL      BUN 5 (L) mg/dL      Creatinine 0.87 mg/dL      Sodium 138 mmol/L      Potassium 3.7 mmol/L      Chloride 100 mmol/L      CO2 27.7 mmol/L      Calcium 9.1 mg/dL      eGFR Non African Amer 70 mL/min/1.73      BUN/Creatinine Ratio 5.7 (L)     Anion Gap 10.3 mmol/L     Narrative:       GFR Normal >60  Chronic Kidney Disease <60  Kidney Failure <15    CBC & Differential [[id number]] Collected:  06/13/18 0556    Specimen:  Blood " Updated:  06/13/18 0700    Narrative:       The following orders were created for panel order CBC & Differential.  Procedure                               Abnormality         Status                     ---------                               -----------         ------                     CBC Auto Differential[[id number]]        Abnormal            Final result                 Please view results for these tests on the individual orders.    CBC Auto Differential [[id number]]  (Abnormal) Collected:  06/13/18 0556    Specimen:  Blood Updated:  06/13/18 0700     WBC 24.52 (H) 10*3/mm3      RBC 4.25 10*6/mm3      Hemoglobin 13.9 g/dL      Hematocrit 42.2 %      MCV 99.3 (H) fL      MCH 32.7 (H) pg      MCHC 32.9 g/dL      RDW 13.2 (H) %      RDW-SD 47.5 fl      MPV 11.7 fL      Platelets 425 10*3/mm3      Neutrophil % 62.1 %      Lymphocyte % 15.9 (L) %      Monocyte % 13.7 (H) %      Eosinophil % 7.5 (H) %      Basophil % 0.2 %      Immature Grans % 0.6 (H) %      Neutrophils, Absolute 15.23 (H) 10*3/mm3      Lymphocytes, Absolute 3.90 10*3/mm3      Monocytes, Absolute 3.36 (H) 10*3/mm3      Eosinophils, Absolute 1.84 (H) 10*3/mm3      Basophils, Absolute 0.04 10*3/mm3      Immature Grans, Absolute 0.15 (H) 10*3/mm3     Tissue Pathology Exam [[id number]] Collected:  06/11/18 0704    Specimen:  Tissue from Stomach Updated:  06/12/18 1210     Case Report --     Surgical Pathology Report                         Case: [id number]                                  Authorizing Provider:  Mick Posadas Jr., MD Collected:           06/11/2018 07:04 AM          Ordering Location:     [facility]  Received:            06/11/2018 10:01 AM                                 [facility] OR                                                                       Pathologist:           Geraldo Mcgarry MD                                                       Specimen:    Stomach, STOMACH                                               "                              Final Diagnosis --     \"STOMACH\":   BENIGN GASTRIC TISSUES - NO SIGNIFICANT HISTOLOGIC ABNORMALITY.    M/[facility]    CPT CODES:  1. 28808         Gross Description --     1. Received in formalin labeled \"stomach\" is a 19 x 3 x 2.5 cm smooth pink segment of stomach with a 21 cm staple line.  The specimen is opened along the staple line revealing a normal pink tan mucosa without identifiable masses or lesions.  Representative sections from throughout the specimen are submitted in five blocks labeled 1A - 1E.  CC/USO/M/pkm        Microscopic Description --     Performed, incorporated in diagnosis.       Embedded Images --             Assessment:      Doing well postoperatively.      Plan:   1. Continue Stage 1 diet  2. Continue with ambulation and Incentive spirometry  3. Plan for d/c home    Patient was seen and examined by Dr. Posadas.    Hospital Course: The patient is a very pleasant 47 y.o. female that was admitted to the hospital with morbid obesity with co-morbidities. Patient underwent laparoscopic sleeve gastrectomy (see OP note) without complication. The patient was then admitted to the bariatric unit per protocol where they remained stable. POD #1 she experienced post-operative nausea and vomiting in the morning so her PO intake was held. By that night she was tolerating some liquids and by POD #2 she felt much better and was doing well with her stage 1 bariatric diet. She was able to be discharged home in good condition.          Discharge medications:      Discharge Medications      New Medications      Instructions Start Date   HYDROcodone-acetaminophen 7.5-325 MG/15ML solution  Commonly known as:  HYCET  Notes to patient:  Last dose: 4:50 am    Take 15 mL by mouth Every 6 (Six) Hours As Needed for Moderate Pain  for up to 10 days.      metoclopramide 10 MG tablet  Commonly known as:  REGLAN  Notes to patient:  Next dose: Before lunch    Take 1 tablet by mouth 4 (Four) Times a " Day Before Meals & at Bedtime.      ondansetron 4 MG tablet  Commonly known as:  ZOFRAN  Notes to patient:  Last dose: 3:00 am   Take 1 tablet by mouth Every 8 (Eight) Hours As Needed for Nausea or Vomiting.         Continue These Medications      Instructions Start Date   apixaban 2.5 MG tablet tablet  Commonly known as:  ELIQUIS  Notes to patient:  Next dose: 06/13/2018   Take 1 tablet by mouth Every 12 (Twelve) Hours for 30 days. TO BEGIN AFTER SURGERY      Calcium 500 MG tablet  Notes to patient:  Next dose: 06/13/2018   Take 500 mg by mouth Daily.      folic acid 1 MG tablet  Commonly known as:  FOLVITE  Notes to patient:  Next dose: 06/13/2018   Take 1 tablet by mouth Daily. Begin two weeks prior to surgery      LamoTRIgine  MG tablet sustained-release 24 hour  Notes to patient:  Next dose: 06/13/2018   Take 300 mg by mouth Every Night.      lansoprazole 15 MG capsule  Commonly known as:  PREVACID  Notes to patient:  Next dose: 06/13/2018   Take 15 mg by mouth Every Night.      MULTIVITAMIN ADULT PO  Notes to patient:  Next dose: 06/13/2018   Take 1 tablet by mouth Daily.      nitrofurantoin (macrocrystal-monohydrate) 100 MG capsule  Commonly known as:  MACROBID  Notes to patient:  Next dose: 06/13/2018    Take 100 mg by mouth Every Night.      valACYclovir 1000 MG tablet  Commonly known as:  VALTREX  Notes to patient:  Next dose: 06/13/2018   Take 1,000 mg by mouth Every Night.      VIIBRYD 40 MG tablet tablet  Generic drug:  vilazodone  Notes to patient:  Next dose: 06/13/2018   Take 40 mg by mouth Every Night.         Stop These Medications    DIVIGEL 1 MG/GM gel  Generic drug:  estradiol            Discharge instructions:  Per Bariatric manual; per our protocol      Follow-up appointment: Follow up with Dr. Posadas in the office as scheduled.  If not already scheduled call for appointment at [phone].

## 2018-06-14 NOTE — PROGRESS NOTES
[facility]ATR[facility] BARIATRIC SURGERY  [address]  [facility] [id number] [phone]  [address]  [facility] [id number] [phone]  Dept: [phone]  6/14/2018      Karen Alarcon.  [id number]  [id number]  1970  female      Chief Complaint   Patient presents with   • Follow-up     1 week follow up  [facility] Post-Op Bariatric Surgery:   Karen Alarcon is status post laparopscopic Laparoscopic Sleeve procedure, performed on 6/11/18.     HPI:   Today's weight is 103 kg (226 lb) pounds, today's BMI is Body mass index is 35.39 kg/m²., she has a  loss of 16 pounds since the last visit and her weight loss since surgery is 16 pounds. The patient reports a decreased portion size and loss of appetite.  Karen Alarcon denies nausea, vomiting, dysphagia, or heartburn. The patient c/o post-op pain that is improving. she is doing well with protein and water intake so far. Taking their vitamins, walking and using IS. Denies fevers, chills, chest pain or shortness of air. Reports that she went 15hours without pain medication due to not liking the way it makes her feel. Discussed taking extra strength tylenol instead. Denies nausea.    Not getting protein. Has newwhey shots. Only getting gatoraide right now.      Diet and Exercise: Diet history reviewed and discussed with the patient. Weight loss/gains to date discussed with the patient. No carbonated beverage consumption and exercising regularly- walking frequently.   Supplements: multivitamins, B-12, calcium, iron, B-1 and Vitamin D.     Review of Systems   Constitutional: Positive for appetite change and fatigue. Negative for unexpected weight change.   HENT: Positive for trouble swallowing.    Eyes: Negative.    Respiratory: Negative.    Cardiovascular: Negative.  Negative for leg swelling.   Gastrointestinal: Positive for abdominal pain (postop) and constipation. Negative for abdominal distention,  diarrhea, nausea and vomiting.   Genitourinary: Negative for difficulty urinating, frequency and urgency.   Musculoskeletal: Negative for back pain.   Skin: Negative.    Psychiatric/Behavioral: Negative.    All other systems reviewed and are negative.      Patient Active Problem List   Diagnosis   • Obesity, Class II, BMI 35-39.9   • Chronic fatigue   • Essential hypertension   • DESIREE (obstructive sleep apnea)   • Snoring   • Heartburn   • Hemorrhoids   • Urge incontinence   • Multiple joint pain   • History of seizure   • Depression   • Hereditary spherocytosis   • History of DVT (deep vein thrombosis)   • History of splenectomy       The following portions of the patient's history were reviewed and updated as appropriate: allergies, current medications, past family history, past medical history, past social history, past surgical history and problem list.    Vitals:    06/14/18 0913   BP: 114/70   Pulse: 68   Resp: 16   Temp: 99.2 °F (37.3 °C)       Physical Exam   Cardiovascular: Normal rate, regular rhythm, normal heart sounds and intact distal pulses.    Pulmonary/Chest: Effort normal and breath sounds normal. No respiratory distress. She has no wheezes.   Abdominal: Soft. Bowel sounds are normal. She exhibits no distension. There is no tenderness.   Skin: Skin is warm, dry and intact. No rash noted. There is erythema.   Incisions closed without drainage and appear to be healing well.        Assessment:   Post-op, the patient is doing well.     Encounter Diagnoses   Name Primary?   • Obesity, Class II, BMI 35-39.9 Yes   • DESIREE (obstructive sleep apnea)    • Essential hypertension    • Multiple joint pain    • Chronic fatigue    • History of seizure        Plan:   Discussed adding gen-pro to jello and gatorade to help her get her protein in, if that is what she is tolerating well.   Reviewed with patient the importance of following the manual for diet progression. Increase activity as tolerated. Continue  increasing daily intake of protein and water.   Return to work: the patient is to return to 3 weeks from their surgery date with no restrictions unless they develop medical problems in which we will see them back in the office. They received a note in our office today with their return to work date.  Activity restrictions: no lifting, pushing or pulling over 25lbs for 3 weeks.   Recommended patient be sure to get at least 70 grams of protein per day. Discussed with the patient the recommended amount of water per day to intake. Reviewed vitamin requirements. Be sure to do routine exercise and increase activity as tolerated. No asa, nsaids or steroids for 8 weeks. Patient may use miralax as needed if necessary.     Instructions / Recommendations: dietary counseling recommended, recommended a daily protein intake of  grams, vitamin supplement(s) recommended, recommended exercising at least 150 minutes per week, behavior modifications recommended and instructed to call the office for concerns, questions, or problems.     The patient was instructed to follow up at one month follow up appt.     The patient was counseled regarding post op bariatric manual

## 2018-06-18 RX ORDER — APIXABAN 2.5 MG/1
TABLET, FILM COATED ORAL
Qty: 60 TABLET | Refills: 0 | OUTPATIENT
Start: 2018-06-18

## 2018-06-22 ENCOUNTER — TELEPHONE (OUTPATIENT)
Dept: BARIATRICS/WEIGHT MGMT | Facility: CLINIC | Age: 48
End: 2018-06-22

## 2018-06-22 NOTE — TELEPHONE ENCOUNTER
Left voicemail to contact the office. She left voicemail asking questions about her post-op diet and what she can eat. Patient is less than 2 weeks out from surgery.

## 2018-06-25 RX ORDER — FOLIC ACID 1 MG/1
TABLET ORAL
Qty: 40 TABLET | Refills: 0 | OUTPATIENT
Start: 2018-06-25

## 2018-07-09 ENCOUNTER — OFFICE VISIT (OUTPATIENT)
Dept: BARIATRICS/WEIGHT MGMT | Facility: CLINIC | Age: 48
End: 2018-07-09

## 2018-07-09 VITALS
TEMPERATURE: 98.4 F | DIASTOLIC BLOOD PRESSURE: 73 MMHG | BODY MASS INDEX: 33.9 KG/M2 | SYSTOLIC BLOOD PRESSURE: 111 MMHG | HEART RATE: 63 BPM | RESPIRATION RATE: 16 BRPM | WEIGHT: 216 LBS | HEIGHT: 67 IN

## 2018-07-09 DIAGNOSIS — I10 ESSENTIAL HYPERTENSION: Primary | ICD-10-CM

## 2018-07-09 DIAGNOSIS — E66.9 OBESITY, CLASS II, BMI 35-39.9: ICD-10-CM

## 2018-07-09 DIAGNOSIS — R12 HEARTBURN: ICD-10-CM

## 2018-07-09 DIAGNOSIS — G47.33 OSA (OBSTRUCTIVE SLEEP APNEA): ICD-10-CM

## 2018-07-09 DIAGNOSIS — F32.A DEPRESSION, UNSPECIFIED DEPRESSION TYPE: ICD-10-CM

## 2018-07-09 DIAGNOSIS — R53.82 CHRONIC FATIGUE: ICD-10-CM

## 2018-07-09 DIAGNOSIS — Z86.718 HISTORY OF DVT (DEEP VEIN THROMBOSIS): ICD-10-CM

## 2018-07-09 PROCEDURE — 99024 POSTOP FOLLOW-UP VISIT: CPT | Performed by: NURSE PRACTITIONER

## 2018-07-09 NOTE — PROGRESS NOTES
[facility] BARIATRIC SURGERY  [address]  [facility] [id number] [phone]  [address]  [facility] [id number] [phone]  Dept: [phone]  7/9/2018      Karen Alarcon.  [id number]  [id number]  1970  female      Chief Complaint   Patient presents with   • Post-op     1 month Post Op GS       [facility] Post-Op Bariatric Surgery:   Karen Alarcon is status post Laparoscopic Sleeve procedure, performed on 6/11/18     HPI:   Today's weight is 98 kg (216 lb) pounds, today's BMI is Body mass index is 33.82 kg/m²., she has a  loss of 10 pounds since the last visit and her weight loss since surgery is 26 pounds. The patient reports a decreased portion size and loss of appetite.      Karen Alarcon denies nausea, vomiting, dysphagia, abdominal pain or heartburn.     Diet and Exercise: Diet history reviewed and discussed with the patient. Weight loss/gains to date discussed with the patient. The patient states they are eating 70-80 grams of protein per day. She reports eating 4 meals per day, a typical portion size of 1/2 cup, eating 2 snacks per day, drinking 2 or more 8-oz. glasses of water per day, no carbonated beverage consumption and exercising regularly- walking and stretching 2-3 days per week.    She is tolerating chicken and eggs so far. She is still supplementing.     Supplements: BA MTV with iron .     Review of Systems   Constitutional: Negative for appetite change, fatigue and unexpected weight change.   HENT: Negative.    Eyes: Negative.    Respiratory: Negative.    Cardiovascular: Negative.  Negative for leg swelling.   Gastrointestinal: Negative for abdominal distention, abdominal pain, constipation, diarrhea, nausea and vomiting.   Genitourinary: Negative for difficulty urinating, frequency and urgency.   Musculoskeletal: Negative for back pain.   Skin: Negative.    Psychiatric/Behavioral: Negative.    All other systems reviewed  and are negative.      Patient Active Problem List   Diagnosis   • Obesity, Class II, BMI 35-39.9   • Chronic fatigue   • Essential hypertension   • DESIREE (obstructive sleep apnea)   • Snoring   • Heartburn   • Hemorrhoids   • Urge incontinence   • Multiple joint pain   • History of seizure   • Depression   • Hereditary spherocytosis (CMS/HCC)   • History of DVT (deep vein thrombosis)   • History of splenectomy       Past Medical History:   Diagnosis Date   • Anxiety and depression    • Blood disorder     hereditary spherocytosis   • Hereditary spherocytosis (CMS/HCC) 9/11/2017   • History of herpes genitalis    • Hypertension     OFF MEDS 2015   • Seizure disorder (CMS/HCC)     spherocytosis   • Sleep apnea    • Stress incontinence        The following portions of the patient's history were reviewed and updated as appropriate: allergies, current medications, past family history, past medical history, past social history, past surgical history and problem list.    Vitals:    07/09/18 1219   BP: 111/73   Pulse: 63   Resp: 16   Temp: 98.4 °F (36.9 °C)       Physical Exam   Constitutional: She appears well-developed and well-nourished.   Neck: No thyromegaly present.   Cardiovascular: Normal rate, regular rhythm and normal heart sounds.    Pulmonary/Chest: Effort normal and breath sounds normal. No respiratory distress. She has no wheezes.   Abdominal: Soft. Bowel sounds are normal. She exhibits no distension. There is no tenderness. There is no guarding. No hernia.   Musculoskeletal: She exhibits no edema or tenderness.   Neurological: She is alert.   Skin: Skin is warm and dry. No rash noted. No erythema.   Psychiatric: She has a normal mood and affect. Her behavior is normal.   Nursing note and vitals reviewed.      Assessment:   Post-op, the patient is doing well.     Encounter Diagnoses   Name Primary?   • Obesity, Class II, BMI 35-39.9    • Essential hypertension Yes   • Chronic fatigue    • DESIREE (obstructive sleep  apnea)    • Heartburn    • Depression, unspecified depression type    • History of DVT (deep vein thrombosis)        Plan:   She plans on working on hitting her protein goal. Encouraged to cut way back on sweet tea. Working on not drinking without eating.   Encouraged patient to be sure to get plenty of lean protein per day through small frequent meals all with a protein source.   Activity restrictions: none.   Recommended patient be sure to get at least 70 grams of protein per day by eating small, frequent meals all with high lean protein choices. Be sure to limit/cut back on daily carbohydrate intake. Discussed with the patient the recommended amount of water per day to intake- half of body weight in ounces. Reviewed vitamin requirements. Be sure to do routine exercise, 150 minutes per week minimum, including both cardio and strength training.     Instructions / Recommendations: dietary counseling recommended, recommended a daily protein intake of  grams, vitamin supplement(s) recommended, recommended exercising at least 150 minutes per week, behavior modifications recommended and instructed to call the office for concerns, questions, or problems.     The patient was instructed to follow up in 2 months .     The patient was counseled regarding exercise, fluid intake, protein intake, lab work. Total time spent face to face was 20 minutes and 15 minutes was spent counseling.

## 2018-09-14 ENCOUNTER — OFFICE VISIT (OUTPATIENT)
Dept: BARIATRICS/WEIGHT MGMT | Facility: CLINIC | Age: 48
End: 2018-09-14

## 2018-09-14 VITALS
WEIGHT: 205 LBS | HEART RATE: 69 BPM | TEMPERATURE: 98.2 F | DIASTOLIC BLOOD PRESSURE: 73 MMHG | RESPIRATION RATE: 16 BRPM | HEIGHT: 67 IN | BODY MASS INDEX: 32.18 KG/M2 | SYSTOLIC BLOOD PRESSURE: 117 MMHG

## 2018-09-14 DIAGNOSIS — I10 ESSENTIAL HYPERTENSION: ICD-10-CM

## 2018-09-14 DIAGNOSIS — M25.50 MULTIPLE JOINT PAIN: ICD-10-CM

## 2018-09-14 DIAGNOSIS — R53.82 CHRONIC FATIGUE: ICD-10-CM

## 2018-09-14 DIAGNOSIS — G47.33 OSA (OBSTRUCTIVE SLEEP APNEA): ICD-10-CM

## 2018-09-14 DIAGNOSIS — Z71.82 EXERCISE COUNSELING: ICD-10-CM

## 2018-09-14 DIAGNOSIS — Z71.3 DIETARY COUNSELING: ICD-10-CM

## 2018-09-14 DIAGNOSIS — E66.9 OBESITY, CLASS I, BMI 30-34.9: Primary | ICD-10-CM

## 2018-09-14 PROBLEM — E66.811 OBESITY, CLASS I, BMI 30-34.9: Status: ACTIVE | Noted: 2018-09-14

## 2018-09-14 PROBLEM — E66.812 OBESITY, CLASS II, BMI 35-39.9: Status: RESOLVED | Noted: 2017-09-11 | Resolved: 2018-09-14

## 2018-09-14 PROBLEM — R12 HEARTBURN: Status: RESOLVED | Noted: 2017-09-11 | Resolved: 2018-09-14

## 2018-09-14 PROCEDURE — 99213 OFFICE O/P EST LOW 20 MIN: CPT | Performed by: NURSE PRACTITIONER

## 2018-09-14 RX ORDER — LAMOTRIGINE 150 MG/1
TABLET ORAL
COMMUNITY
Start: 2018-08-18 | End: 2018-09-14

## 2018-09-14 NOTE — PROGRESS NOTES
[facility]IATR[facility] BARIATRIC SURGERY  [address]  [facility] [address] [phone]  [address]  [facility] [address] [phone]  Dept: [phone]  9/14/2018      Karen Alarcon.  [id number]  [id number]  1970  female      Chief Complaint   Patient presents with   • Follow-up     3 MONTH SLEEVE FOLLOW UP       [facility] Post-Op Bariatric Surgery:   Karen Alarcon is status post Laparoscopic Sleeve procedure, performed on 6/11/18     HPI:   Today's weight is 93 kg (205 lb) pounds, today's BMI is Body mass index is 32.1 kg/m²., she has a  loss of 11 pounds since the last visit and her weight loss since surgery is 37 pounds. The patient reports a decreased portion size and loss of appetite.      Karen Alarcon denies nausea, vomiting, dysphagia, abdominal pain or heartburn.      Diet and Exercise: Diet history reviewed and discussed with the patient. Weight loss/gains to date discussed with the patient. The patient states they are eating 60-80 grams of protein per day. She reports eating 3 meals per day, a typical portion size of 1/2-1 cup, eating 3 snacks per day, drinking 3 or more 8-oz. glasses of water per day, no carbonated beverage consumption and exercising regularly- walking 3-4 times per week.     Supplements: Bariatric MVI with iron and calcium.     Review of Systems   All other systems reviewed and are negative.      Patient Active Problem List   Diagnosis   • Chronic fatigue   • Essential hypertension   • DESIREE (obstructive sleep apnea)   • Hemorrhoids   • Urge incontinence   • Multiple joint pain   • History of seizure   • Depression   • Hereditary spherocytosis (CMS/HCC)   • History of DVT (deep vein thrombosis)   • History of splenectomy   • Obesity, Class I, BMI 30-34.9   • Dietary counseling   • Exercise counseling       Past Medical History:   Diagnosis Date   • Anxiety and depression    • Blood disorder     hereditary spherocytosis    • Hereditary spherocytosis (CMS/HCC) 9/11/2017   • History of herpes genitalis    • Hypertension     OFF MEDS 2015   • Seizure disorder (CMS/HCC)     spherocytosis   • Sleep apnea    • Stress incontinence        The following portions of the patient's history were reviewed and updated as appropriate: allergies, current medications, past family history, past medical history, past social history, past surgical history and problem list.    Vitals:    09/14/18 1506   BP: 117/73   Pulse: 69   Resp: 16   Temp: 98.2 °F (36.8 °C)       Physical Exam   Constitutional: She is oriented to person, place, and time. She appears well-developed and well-nourished.   HENT:   Head: Normocephalic and atraumatic.   Eyes: EOM are normal.   Cardiovascular: Normal rate, regular rhythm and normal heart sounds.    Pulmonary/Chest: Effort normal and breath sounds normal.   Abdominal: Soft. Bowel sounds are normal. She exhibits no distension. There is no tenderness.   Musculoskeletal: Normal range of motion.   Neurological: She is alert and oriented to person, place, and time.   Skin: Skin is warm and dry.   Psychiatric: She has a normal mood and affect. Her behavior is normal. Judgment and thought content normal.   Vitals reviewed.      Assessment:   Post-op, the patient is doing well.     Encounter Diagnoses   Name Primary?   • Obesity, Class I, BMI 30-34.9 Yes   • Dietary counseling    • Exercise counseling    • Essential hypertension    • DESIREE (obstructive sleep apnea)    • Multiple joint pain    • Chronic fatigue        Plan:     Encouraged patient to be sure to get plenty of lean protein per day through small frequent meals all with a protein source. Discussed dietary choices. Continue with bariatric vitamin. Increase water intake. Recommended she add strength training to her cardio routine. Reviewed her goal weight and time frame- 12 mo goal 190#  Activity restrictions: none.   Recommended patient be sure to get at least 70 grams of  protein per day by eating small, frequent meals all with high lean protein choices. Be sure to limit/cut back on daily carbohydrate intake. Discussed with the patient the recommended amount of water per day to intake- half of body weight in ounces. Reviewed vitamin requirements. Be sure to do routine exercise, 150 minutes per week minimum, including both cardio and strength training.     Instructions / Recommendations: dietary counseling recommended, recommended a daily protein intake of  grams, vitamin supplement(s) recommended, recommended exercising at least 150 minutes per week, behavior modifications recommended and instructed to call the office for concerns, questions, or problems.     The patient was instructed to follow up in 3 months.     The patient was counseled regarding. Total time spent face to face was 15 minutes and 10 minutes was spent counseling.

## 2019-01-02 ENCOUNTER — OFFICE VISIT (OUTPATIENT)
Dept: BARIATRICS/WEIGHT MGMT | Facility: CLINIC | Age: 49
End: 2019-01-02

## 2019-01-02 ENCOUNTER — LAB (OUTPATIENT)
Dept: LAB | Facility: HOSPITAL | Age: 49
End: 2019-01-02

## 2019-01-02 VITALS
BODY MASS INDEX: 31.08 KG/M2 | RESPIRATION RATE: 18 BRPM | HEIGHT: 67 IN | HEART RATE: 61 BPM | TEMPERATURE: 97.7 F | DIASTOLIC BLOOD PRESSURE: 68 MMHG | SYSTOLIC BLOOD PRESSURE: 109 MMHG | WEIGHT: 198 LBS

## 2019-01-02 DIAGNOSIS — R53.82 CHRONIC FATIGUE: ICD-10-CM

## 2019-01-02 DIAGNOSIS — Z86.718 HISTORY OF DVT (DEEP VEIN THROMBOSIS): ICD-10-CM

## 2019-01-02 DIAGNOSIS — I10 ESSENTIAL HYPERTENSION: ICD-10-CM

## 2019-01-02 DIAGNOSIS — M25.50 MULTIPLE JOINT PAIN: ICD-10-CM

## 2019-01-02 DIAGNOSIS — E66.9 OBESITY, CLASS I, BMI 30-34.9: Primary | ICD-10-CM

## 2019-01-02 DIAGNOSIS — Z71.82 EXERCISE COUNSELING: ICD-10-CM

## 2019-01-02 DIAGNOSIS — G47.33 OSA (OBSTRUCTIVE SLEEP APNEA): ICD-10-CM

## 2019-01-02 DIAGNOSIS — E66.9 OBESITY, CLASS II, BMI 35-39.9: ICD-10-CM

## 2019-01-02 DIAGNOSIS — F32.A DEPRESSION, UNSPECIFIED DEPRESSION TYPE: ICD-10-CM

## 2019-01-02 DIAGNOSIS — Z71.3 DIETARY COUNSELING: ICD-10-CM

## 2019-01-02 LAB
25(OH)D3 SERPL-MCNC: 27.2 NG/ML (ref 30–100)
ALBUMIN SERPL-MCNC: 3.9 G/DL (ref 3.5–5.2)
ALBUMIN/GLOB SERPL: 1.1 G/DL
ALP SERPL-CCNC: 103 U/L (ref 39–117)
ALT SERPL W P-5'-P-CCNC: 15 U/L (ref 1–33)
ANION GAP SERPL CALCULATED.3IONS-SCNC: 9 MMOL/L
AST SERPL-CCNC: 14 U/L (ref 1–32)
BASOPHILS # BLD AUTO: 0.05 10*3/MM3 (ref 0–0.2)
BASOPHILS NFR BLD AUTO: 0.3 % (ref 0–1.5)
BILIRUB SERPL-MCNC: 0.4 MG/DL (ref 0.1–1.2)
BUN BLD-MCNC: 15 MG/DL (ref 6–20)
BUN/CREAT SERPL: 14 (ref 7–25)
CALCIUM SPEC-SCNC: 9.2 MG/DL (ref 8.6–10.5)
CHLORIDE SERPL-SCNC: 104 MMOL/L (ref 98–107)
CO2 SERPL-SCNC: 30 MMOL/L (ref 22–29)
CREAT BLD-MCNC: 1.07 MG/DL (ref 0.57–1)
DEPRECATED RDW RBC AUTO: 42.4 FL (ref 37–54)
EOSINOPHIL # BLD AUTO: 0.4 10*3/MM3 (ref 0–0.7)
EOSINOPHIL NFR BLD AUTO: 2.4 % (ref 0.3–6.2)
ERYTHROCYTE [DISTWIDTH] IN BLOOD BY AUTOMATED COUNT: 12.3 % (ref 11.7–13)
FERRITIN SERPL-MCNC: 175.2 NG/ML (ref 13–150)
FOLATE SERPL-MCNC: 9.62 NG/ML (ref 4.78–24.2)
GFR SERPL CREATININE-BSD FRML MDRD: 55 ML/MIN/1.73
GLOBULIN UR ELPH-MCNC: 3.7 GM/DL
GLUCOSE BLD-MCNC: 87 MG/DL (ref 65–99)
HCT VFR BLD AUTO: 44.3 % (ref 35.6–45.5)
HGB BLD-MCNC: 15.1 G/DL (ref 11.9–15.5)
IMM GRANULOCYTES # BLD AUTO: 0.14 10*3/MM3 (ref 0–0.03)
IMM GRANULOCYTES NFR BLD AUTO: 0.9 % (ref 0–0.5)
IRON 24H UR-MRATE: 97 MCG/DL (ref 37–145)
LYMPHOCYTES # BLD AUTO: 7.07 10*3/MM3 (ref 0.9–4.8)
LYMPHOCYTES NFR BLD AUTO: 42.9 % (ref 19.6–45.3)
MAGNESIUM SERPL-MCNC: 2.3 MG/DL (ref 1.6–2.6)
MCH RBC QN AUTO: 32.5 PG (ref 26.9–32)
MCHC RBC AUTO-ENTMCNC: 34.1 G/DL (ref 32.4–36.3)
MCV RBC AUTO: 95.5 FL (ref 80.5–98.2)
MONOCYTES # BLD AUTO: 1.99 10*3/MM3 (ref 0.2–1.2)
MONOCYTES NFR BLD AUTO: 12.1 % (ref 5–12)
NEUTROPHILS # BLD AUTO: 6.82 10*3/MM3 (ref 1.9–8.1)
NEUTROPHILS NFR BLD AUTO: 41.4 % (ref 42.7–76)
PHOSPHATE SERPL-MCNC: 3.8 MG/DL (ref 2.5–4.5)
PLATELET # BLD AUTO: 499 10*3/MM3 (ref 140–500)
PMV BLD AUTO: 10.4 FL (ref 6–12)
POTASSIUM BLD-SCNC: 4 MMOL/L (ref 3.5–5.2)
PREALB SERPL-MCNC: 21.3 MG/DL (ref 20–40)
PROT SERPL-MCNC: 7.6 G/DL (ref 6–8.5)
RBC # BLD AUTO: 4.64 10*6/MM3 (ref 3.9–5.2)
SODIUM BLD-SCNC: 143 MMOL/L (ref 136–145)
WBC NRBC COR # BLD: 16.47 10*3/MM3 (ref 4.5–10.7)

## 2019-01-02 PROCEDURE — 82728 ASSAY OF FERRITIN: CPT

## 2019-01-02 PROCEDURE — 84134 ASSAY OF PREALBUMIN: CPT

## 2019-01-02 PROCEDURE — 99213 OFFICE O/P EST LOW 20 MIN: CPT | Performed by: NURSE PRACTITIONER

## 2019-01-02 PROCEDURE — 84590 ASSAY OF VITAMIN A: CPT

## 2019-01-02 PROCEDURE — 85025 COMPLETE CBC W/AUTO DIFF WBC: CPT

## 2019-01-02 PROCEDURE — 83540 ASSAY OF IRON: CPT

## 2019-01-02 PROCEDURE — 82306 VITAMIN D 25 HYDROXY: CPT

## 2019-01-02 PROCEDURE — 84100 ASSAY OF PHOSPHORUS: CPT

## 2019-01-02 PROCEDURE — 82746 ASSAY OF FOLIC ACID SERUM: CPT

## 2019-01-02 PROCEDURE — 84425 ASSAY OF VITAMIN B-1: CPT

## 2019-01-02 PROCEDURE — 36415 COLL VENOUS BLD VENIPUNCTURE: CPT

## 2019-01-02 PROCEDURE — 84597 ASSAY OF VITAMIN K: CPT

## 2019-01-02 PROCEDURE — 84446 ASSAY OF VITAMIN E: CPT

## 2019-01-02 PROCEDURE — 83735 ASSAY OF MAGNESIUM: CPT

## 2019-01-02 PROCEDURE — 80053 COMPREHEN METABOLIC PANEL: CPT

## 2019-01-02 PROCEDURE — 84630 ASSAY OF ZINC: CPT

## 2019-01-02 PROCEDURE — 83921 ORGANIC ACID SINGLE QUANT: CPT

## 2019-01-02 RX ORDER — LAMOTRIGINE 300 MG/1
TABLET, EXTENDED RELEASE ORAL
COMMUNITY
Start: 2016-06-27

## 2019-01-02 NOTE — PROGRESS NOTES
[facility] BARIATR[facility] BARIATRIC SURGERY  [address]  [facility] [address] [phone]  [address]  [facility] [address] [phone]  Dept: [phone]  1/2/2019      Karen Alarcon.  [id number]  [id number]  1970  female      Chief Complaint   Patient presents with   • Follow-up     6 MONTH SLEEVE FOLLOW UP       [facility] Post-Op Bariatric Surgery:   Karen Alarcon is status post Laparoscopic Sleeve procedure, performed on 6/11/118     HPI:   Today's weight is 89.8 kg (198 lb) pounds, today's BMI is Body mass index is 31 kg/m²., she has a  loss of 7 pounds since the last visit and her weight loss since surgery is 44 pounds. The patient reports a decreased portion size and loss of appetite.      Karen Alarcon denies nausea, vomiting, dysphagia, abdominal pain or heartburn.     Diet and Exercise: Diet history reviewed and discussed with the patient. Weight loss/gains to date discussed with the patient. The patient states they are eating 60 grams of protein per day. She reports eating 3 meals per day, a typical portion size of 1/2 cup, eating 2 snacks per day, drinking 2 or more 8-oz. glasses of water per day, no carbonated beverage consumption. Denies regular exercise.    Supplements: BA MTV with iron.     Review of Systems   Constitutional: Positive for appetite change. Negative for fatigue and unexpected weight change.   HENT: Negative.    Eyes: Negative.    Respiratory: Negative.    Cardiovascular: Negative.  Negative for leg swelling.   Gastrointestinal: Negative for abdominal distention, abdominal pain, constipation, diarrhea, nausea and vomiting.   Genitourinary: Negative for difficulty urinating, frequency and urgency.   Musculoskeletal: Negative for back pain.   Skin: Negative.    Psychiatric/Behavioral: Negative.    All other systems reviewed and are negative.      Patient Active Problem List   Diagnosis   • Chronic fatigue   • Essential  hypertension   • DESIREE (obstructive sleep apnea)   • Hemorrhoids   • Urge incontinence   • Multiple joint pain   • History of seizure   • Depression   • Hereditary spherocytosis (CMS/HCC)   • History of DVT (deep vein thrombosis)   • History of splenectomy   • Obesity, Class I, BMI 30-34.9   • Dietary counseling   • Exercise counseling       Past Medical History:   Diagnosis Date   • Anxiety and depression    • Blood disorder     hereditary spherocytosis   • Hereditary spherocytosis (CMS/HCC) 9/11/2017   • History of herpes genitalis    • Hypertension     OFF MEDS 2015   • Seizure disorder (CMS/HCC)     spherocytosis   • Sleep apnea    • Stress incontinence        The following portions of the patient's history were reviewed and updated as appropriate: allergies, current medications, past family history, past medical history, past social history, past surgical history and problem list.    Vitals:    01/02/19 1501   BP: 109/68   Pulse: 61   Resp: 18   Temp: 97.7 °F (36.5 °C)       Physical Exam   Constitutional: She appears well-developed and well-nourished.   Neck: No thyromegaly present.   Cardiovascular: Normal rate, regular rhythm and normal heart sounds.   Pulmonary/Chest: Effort normal and breath sounds normal. No respiratory distress. She has no wheezes.   Abdominal: Soft. Bowel sounds are normal. She exhibits no distension. There is no tenderness. There is no guarding. No hernia.   Musculoskeletal: She exhibits no edema or tenderness.   Neurological: She is alert.   Skin: Skin is warm and dry. No rash noted. No erythema.   Psychiatric: She has a normal mood and affect. Her behavior is normal.   Nursing note and vitals reviewed.      Assessment:   Post-op, the patient is doing well.     Encounter Diagnoses   Name Primary?   • Obesity, Class I, BMI 30-34.9 Yes   • Exercise counseling    • Dietary counseling    • Multiple joint pain    • DESIREE (obstructive sleep apnea)    • Essential hypertension        Plan:    Encouraged patient to be sure to get plenty of lean protein per day through small frequent meals all with a protein source.   Activity restrictions: none.   Recommended patient be sure to get at least 70 grams of protein per day by eating small, frequent meals all with high lean protein choices. Be sure to limit/cut back on daily carbohydrate intake. Discussed with the patient the recommended amount of water per day to intake- half of body weight in ounces. Reviewed vitamin requirements. Be sure to do routine exercise, 150 minutes per week minimum, including both cardio and strength training.     Instructions / Recommendations: dietary counseling recommended, recommended a daily protein intake of  grams, vitamin supplement(s) recommended, recommended exercising at least 150 minutes per week, behavior modifications recommended and instructed to call the office for concerns, questions, or problems.     The patient was instructed to follow up in 3 months .     The patient was counseled regarding exercise, fluid intake, protein intake. Total time spent face to face was 20 minutes and 15 minutes was spent counseling.

## 2019-01-05 LAB
A-TOCOPHEROL VIT E SERPL-MCNC: 11.3 MG/L (ref 7–25.1)
GAMMA TOCOPHEROL SERPL-MCNC: 1.9 MG/L (ref 0.5–5.5)
VIT A SERPL-MCNC: 44.1 UG/DL (ref 20.1–62)
VIT B1 BLD-SCNC: 167.8 NMOL/L (ref 66.5–200)
ZINC SERPL-MCNC: 66 UG/DL (ref 56–134)

## 2019-01-08 LAB — PHYTONADIONE SERPL-MCNC: 1.87 NG/ML (ref 0.13–1.88)

## 2019-01-09 LAB
Lab: NORMAL
METHYLMALONATE SERPL-SCNC: 339 NMOL/L (ref 0–378)

## 2019-01-11 ENCOUNTER — TELEPHONE (OUTPATIENT)
Dept: BARIATRICS/WEIGHT MGMT | Facility: CLINIC | Age: 49
End: 2019-01-11

## 2019-01-11 NOTE — TELEPHONE ENCOUNTER
----- Message from TERRY Tsai sent at 1/11/2019  2:44 PM EST -----  Please advise patient to add an extra OTC vitamin D supplement to her regimen

## 2019-02-12 ENCOUNTER — APPOINTMENT (OUTPATIENT)
Dept: WOMENS IMAGING | Facility: HOSPITAL | Age: 49
End: 2019-02-12

## 2019-02-12 PROCEDURE — 77067 SCR MAMMO BI INCL CAD: CPT | Performed by: RADIOLOGY

## 2019-04-03 ENCOUNTER — OFFICE VISIT (OUTPATIENT)
Dept: BARIATRICS/WEIGHT MGMT | Facility: CLINIC | Age: 49
End: 2019-04-03

## 2019-04-03 VITALS
BODY MASS INDEX: 31.23 KG/M2 | SYSTOLIC BLOOD PRESSURE: 107 MMHG | TEMPERATURE: 98.2 F | HEIGHT: 67 IN | HEART RATE: 82 BPM | RESPIRATION RATE: 18 BRPM | WEIGHT: 199 LBS | DIASTOLIC BLOOD PRESSURE: 69 MMHG

## 2019-04-03 DIAGNOSIS — R53.82 CHRONIC FATIGUE: ICD-10-CM

## 2019-04-03 DIAGNOSIS — M25.50 MULTIPLE JOINT PAIN: ICD-10-CM

## 2019-04-03 DIAGNOSIS — E66.9 OBESITY, CLASS I, BMI 30-34.9: Primary | ICD-10-CM

## 2019-04-03 DIAGNOSIS — I10 ESSENTIAL HYPERTENSION: ICD-10-CM

## 2019-04-03 DIAGNOSIS — G47.33 OSA (OBSTRUCTIVE SLEEP APNEA): ICD-10-CM

## 2019-04-03 PROCEDURE — 99213 OFFICE O/P EST LOW 20 MIN: CPT | Performed by: NURSE PRACTITIONER

## 2019-04-03 NOTE — PROGRESS NOTES
[facility] BARIATRIC SURGERY  [address]  [facility] [id number] [phone]  [address]  [facility] [id number] [phone]  Dept: [phone]  4/3/2019      Karen Alarcon.  [id number]  [id number]  1970  female      Chief Complaint   Patient presents with   • Follow-up     9 month sleeve follow up       BH Post-Op Bariatric Surgery:   Karen Alarcon is status post Laparoscopic Sleeve procedure, performed on 6/11/19     HPI:   Today's weight is 90.3 kg (199 lb) pounds, today's BMI is Body mass index is 31.16 kg/m²., she has a  gain of 1 pounds since the last visit and her weight loss since surgery is 43 pounds. The patient reports a decreased portion size and loss of appetite.      Karen Alarcon denies nausea, vomiting, dysphagia, abdominal pain or heartburn.     Diet and Exercise: Diet history reviewed and discussed with the patient. Weight loss/gains to date discussed with the patient. The patient states they are eating 60-70 grams of protein per day. She reports eating 2 meals per day, a typical portion size of 1-2 cup, eating 4 snacks per day, drinking 3 or more 8-oz. glasses of water per day, no carbonated beverage consumption. Denies regular exercise. Reports eating sweets, reports little motivation to exercise or cut back on simple carbs.    Supplements: BA MTV with iron.     Review of Systems   Constitutional: Positive for appetite change. Negative for fatigue and unexpected weight change.   HENT: Negative.    Eyes: Negative.    Respiratory: Negative.    Cardiovascular: Negative.  Negative for leg swelling.   Gastrointestinal: Negative for abdominal distention, abdominal pain, constipation, diarrhea, nausea and vomiting.   Genitourinary: Negative for difficulty urinating, frequency and urgency.   Musculoskeletal: Negative for back pain.   Skin: Negative.    Psychiatric/Behavioral: Negative.    All other systems reviewed and  are negative.      Patient Active Problem List   Diagnosis   • Chronic fatigue   • Essential hypertension   • DESIREE (obstructive sleep apnea)   • Hemorrhoids   • Urge incontinence   • Multiple joint pain   • History of seizure   • Depression   • Hereditary spherocytosis (CMS/HCC)   • History of DVT (deep vein thrombosis)   • History of splenectomy   • Obesity, Class I, BMI 30-34.9   • Dietary counseling   • Exercise counseling       Past Medical History:   Diagnosis Date   • Anxiety and depression    • Blood disorder     hereditary spherocytosis   • Hereditary spherocytosis (CMS/HCC) 9/11/2017   • History of herpes genitalis    • Hypertension     OFF MEDS 2015   • Seizure disorder (CMS/HCC)     spherocytosis   • Sleep apnea    • Stress incontinence        The following portions of the patient's history were reviewed and updated as appropriate: allergies, current medications, past family history, past medical history, past social history, past surgical history and problem list.    Vitals:    04/03/19 1405   BP: 107/69   Pulse: 82   Resp: 18   Temp: 98.2 °F (36.8 °C)       Physical Exam   Constitutional: She appears well-developed and well-nourished.   Neck: No thyromegaly present.   Cardiovascular: Normal rate, regular rhythm and normal heart sounds.   Pulmonary/Chest: Effort normal and breath sounds normal. No respiratory distress. She has no wheezes.   Abdominal: Soft. Bowel sounds are normal. She exhibits no distension. There is no tenderness. There is no guarding. No hernia.   Musculoskeletal: She exhibits no edema or tenderness.   Neurological: She is alert.   Skin: Skin is warm and dry. No rash noted. No erythema.   Psychiatric: She has a normal mood and affect. Her behavior is normal.   Nursing note and vitals reviewed.      Assessment:   Post-op, the patient is doing well.     Encounter Diagnoses   Name Primary?   • Obesity, Class I, BMI 30-34.9 Yes   • Multiple joint pain    • DESIREE (obstructive sleep apnea)     • Essential hypertension    • Chronic fatigue        Plan:   Discussed increasing daily protein with the focus on lean, whole food protein sources. Cutting back on snacking and simple carbs, increasing fluid intake.   Encouraged patient to be sure to get plenty of lean protein per day through small frequent meals all with a protein source.   Activity restrictions: none.   Recommended patient be sure to get at least 70 grams of protein per day by eating small, frequent meals all with high lean protein choices. Be sure to limit/cut back on daily carbohydrate intake. Discussed with the patient the recommended amount of water per day to intake- half of body weight in ounces. Reviewed vitamin requirements. Be sure to do routine exercise, 150 minutes per week minimum, including both cardio and strength training.     Instructions / Recommendations: dietary counseling recommended, recommended a daily protein intake of  grams, vitamin supplement(s) recommended, recommended exercising at least 150 minutes per week, behavior modifications recommended and instructed to call the office for concerns, questions, or problems.     The patient was instructed to follow up in 3 months .      Total time spent face to face was 20 minutes and 15 minutes was spent counseling.

## 2019-04-12 NOTE — PATIENT INSTRUCTIONS
Growth u/s 30% 6# 10 oz. Baby active, no SOL. Discussed NST's. Declines today. Agrees to do next week if undelivered. Given contact info to schedule. Importance of active FM. SOL and warning signs reviewed. The patient was instructed to follow our dietary recommendations regarding a high lean protein, low carb and low fat diet. Reviewed appropriate amount of water to intake daily. Be sure to take vitamins as recommended. Patient was instructed to get at least 150 minutes of exercise weekly including both cardio and strength training.

## 2019-05-18 ENCOUNTER — HOSPITAL ENCOUNTER (EMERGENCY)
Facility: HOSPITAL | Age: 49
Discharge: HOME | End: 2019-05-18
Attending: EMERGENCY MEDICINE | Admitting: EMERGENCY MEDICINE

## 2019-05-18 ENCOUNTER — APPOINTMENT (OUTPATIENT)
Dept: CT IMAGING | Facility: HOSPITAL | Age: 49
End: 2019-05-18

## 2019-05-18 VITALS
BODY MASS INDEX: 31.17 KG/M2 | OXYGEN SATURATION: 98 % | HEIGHT: 67 IN | RESPIRATION RATE: 18 BRPM | TEMPERATURE: 97.4 F | HEART RATE: 63 BPM | DIASTOLIC BLOOD PRESSURE: 85 MMHG | SYSTOLIC BLOOD PRESSURE: 144 MMHG

## 2019-05-18 DIAGNOSIS — R10.9 FLANK PAIN: Primary | ICD-10-CM

## 2019-05-18 LAB
ALBUMIN SERPL-MCNC: 4.3 G/DL (ref 3.5–5.2)
ALBUMIN/GLOB SERPL: 1.3 G/DL
ALP SERPL-CCNC: 99 U/L (ref 39–117)
ALT SERPL W P-5'-P-CCNC: 10 U/L (ref 1–33)
ANION GAP SERPL CALCULATED.3IONS-SCNC: 11.9 MMOL/L
AST SERPL-CCNC: 17 U/L (ref 1–32)
BACTERIA UR QL AUTO: ABNORMAL /HPF
BASOPHILS # BLD MANUAL: 0.25 10*3/MM3 (ref 0–0.2)
BASOPHILS NFR BLD AUTO: 1 % (ref 0–1.5)
BILIRUB SERPL-MCNC: 0.5 MG/DL (ref 0.2–1.2)
BILIRUB UR QL STRIP: NEGATIVE
BUN BLD-MCNC: 13 MG/DL (ref 6–20)
BUN/CREAT SERPL: 11.8 (ref 7–25)
CALCIUM SPEC-SCNC: 9.1 MG/DL (ref 8.6–10.5)
CHLORIDE SERPL-SCNC: 102 MMOL/L (ref 98–107)
CLARITY UR: CLEAR
CO2 SERPL-SCNC: 26.1 MMOL/L (ref 22–29)
COLOR UR: YELLOW
CREAT BLD-MCNC: 1.1 MG/DL (ref 0.57–1)
DEPRECATED RDW RBC AUTO: 39.4 FL (ref 37–54)
EOSINOPHIL # BLD MANUAL: 0.49 10*3/MM3 (ref 0–0.4)
EOSINOPHIL NFR BLD MANUAL: 2 % (ref 0.3–6.2)
ERYTHROCYTE [DISTWIDTH] IN BLOOD BY AUTOMATED COUNT: 11.8 % (ref 12.3–15.4)
GFR SERPL CREATININE-BSD FRML MDRD: 53 ML/MIN/1.73
GLOBULIN UR ELPH-MCNC: 3.3 GM/DL
GLUCOSE BLD-MCNC: 114 MG/DL (ref 65–99)
GLUCOSE UR STRIP-MCNC: NEGATIVE MG/DL
HCG SERPL QL: NEGATIVE
HCT VFR BLD AUTO: 41.2 % (ref 34–46.6)
HGB BLD-MCNC: 14.6 G/DL (ref 12–15.9)
HGB UR QL STRIP.AUTO: ABNORMAL
HYALINE CASTS UR QL AUTO: ABNORMAL /LPF
KETONES UR QL STRIP: NEGATIVE
LEUKOCYTE ESTERASE UR QL STRIP.AUTO: ABNORMAL
LYMPHOCYTES # BLD MANUAL: 15.36 10*3/MM3 (ref 0.7–3.1)
LYMPHOCYTES NFR BLD MANUAL: 62.6 % (ref 19.6–45.3)
LYMPHOCYTES NFR BLD MANUAL: 7.1 % (ref 5–12)
MCH RBC QN AUTO: 32.5 PG (ref 26.6–33)
MCHC RBC AUTO-ENTMCNC: 35.4 G/DL (ref 31.5–35.7)
MCV RBC AUTO: 91.8 FL (ref 79–97)
MONOCYTES # BLD AUTO: 1.74 10*3/MM3 (ref 0.1–0.9)
NEUTROPHILS # BLD AUTO: 6.7 10*3/MM3 (ref 1.7–7)
NEUTROPHILS NFR BLD MANUAL: 27.3 % (ref 42.7–76)
NITRITE UR QL STRIP: NEGATIVE
PH UR STRIP.AUTO: 7.5 [PH] (ref 5–8)
PLAT MORPH BLD: NORMAL
PLATELET # BLD AUTO: 547 10*3/MM3 (ref 140–450)
PMV BLD AUTO: 11 FL (ref 6–12)
POTASSIUM BLD-SCNC: 3.4 MMOL/L (ref 3.5–5.2)
PROT SERPL-MCNC: 7.6 G/DL (ref 6–8.5)
PROT UR QL STRIP: NEGATIVE
RBC # BLD AUTO: 4.49 10*6/MM3 (ref 3.77–5.28)
RBC # UR: ABNORMAL /HPF
RBC MORPH BLD: NORMAL
REF LAB TEST METHOD: ABNORMAL
SODIUM BLD-SCNC: 140 MMOL/L (ref 136–145)
SP GR UR STRIP: 1.01 (ref 1–1.03)
SQUAMOUS #/AREA URNS HPF: ABNORMAL /HPF
UROBILINOGEN UR QL STRIP: ABNORMAL
WBC MORPH BLD: NORMAL
WBC NRBC COR # BLD: 24.54 10*3/MM3 (ref 3.4–10.8)
WBC UR QL AUTO: ABNORMAL /HPF

## 2019-05-18 PROCEDURE — 99283 EMERGENCY DEPT VISIT LOW MDM: CPT

## 2019-05-18 PROCEDURE — 74176 CT ABD & PELVIS W/O CONTRAST: CPT

## 2019-05-18 PROCEDURE — 81001 URINALYSIS AUTO W/SCOPE: CPT | Performed by: EMERGENCY MEDICINE

## 2019-05-18 PROCEDURE — 96374 THER/PROPH/DIAG INJ IV PUSH: CPT

## 2019-05-18 PROCEDURE — 85007 BL SMEAR W/DIFF WBC COUNT: CPT | Performed by: EMERGENCY MEDICINE

## 2019-05-18 PROCEDURE — 84703 CHORIONIC GONADOTROPIN ASSAY: CPT | Performed by: EMERGENCY MEDICINE

## 2019-05-18 PROCEDURE — 25010000002 MORPHINE PER 10 MG: Performed by: EMERGENCY MEDICINE

## 2019-05-18 PROCEDURE — 25010000002 ONDANSETRON PER 1 MG: Performed by: EMERGENCY MEDICINE

## 2019-05-18 PROCEDURE — 80053 COMPREHEN METABOLIC PANEL: CPT | Performed by: EMERGENCY MEDICINE

## 2019-05-18 PROCEDURE — 96375 TX/PRO/DX INJ NEW DRUG ADDON: CPT

## 2019-05-18 PROCEDURE — 85025 COMPLETE CBC W/AUTO DIFF WBC: CPT | Performed by: EMERGENCY MEDICINE

## 2019-05-18 RX ORDER — HYDROCODONE BITARTRATE AND ACETAMINOPHEN 5; 325 MG/1; MG/1
1 TABLET ORAL EVERY 6 HOURS PRN
Qty: 15 TABLET | Refills: 0 | Status: SHIPPED | OUTPATIENT
Start: 2019-05-18 | End: 2019-07-08

## 2019-05-18 RX ORDER — SULFAMETHOXAZOLE AND TRIMETHOPRIM 800; 160 MG/1; MG/1
1 TABLET ORAL 2 TIMES DAILY
Qty: 20 TABLET | Refills: 0 | Status: SHIPPED | OUTPATIENT
Start: 2019-05-18 | End: 2021-08-23

## 2019-05-18 RX ORDER — ONDANSETRON 4 MG/1
4 TABLET, FILM COATED ORAL EVERY 8 HOURS PRN
Qty: 10 TABLET | Refills: 0 | Status: SHIPPED | OUTPATIENT
Start: 2019-05-18 | End: 2021-08-23

## 2019-05-18 RX ORDER — ONDANSETRON 2 MG/ML
4 INJECTION INTRAMUSCULAR; INTRAVENOUS ONCE
Status: COMPLETED | OUTPATIENT
Start: 2019-05-18 | End: 2019-05-18

## 2019-05-18 RX ORDER — SODIUM CHLORIDE 0.9 % (FLUSH) 0.9 %
10 SYRINGE (ML) INJECTION AS NEEDED
Status: DISCONTINUED | OUTPATIENT
Start: 2019-05-18 | End: 2019-05-18 | Stop reason: HOSPADM

## 2019-05-18 RX ORDER — MORPHINE SULFATE 2 MG/ML
4 INJECTION, SOLUTION INTRAMUSCULAR; INTRAVENOUS ONCE
Status: COMPLETED | OUTPATIENT
Start: 2019-05-18 | End: 2019-05-18

## 2019-05-18 RX ADMIN — ONDANSETRON HYDROCHLORIDE 4 MG: 2 SOLUTION INTRAMUSCULAR; INTRAVENOUS at 01:22

## 2019-05-18 RX ADMIN — MORPHINE SULFATE 4 MG: 2 INJECTION, SOLUTION INTRAMUSCULAR; INTRAVENOUS at 01:22

## 2019-07-08 ENCOUNTER — OFFICE VISIT (OUTPATIENT)
Dept: BARIATRICS/WEIGHT MGMT | Facility: CLINIC | Age: 49
End: 2019-07-08

## 2019-07-08 VITALS
DIASTOLIC BLOOD PRESSURE: 76 MMHG | HEIGHT: 67 IN | SYSTOLIC BLOOD PRESSURE: 115 MMHG | BODY MASS INDEX: 31.71 KG/M2 | HEART RATE: 74 BPM | RESPIRATION RATE: 18 BRPM | WEIGHT: 202 LBS | TEMPERATURE: 97.9 F

## 2019-07-08 DIAGNOSIS — F32.A DEPRESSION, UNSPECIFIED DEPRESSION TYPE: ICD-10-CM

## 2019-07-08 DIAGNOSIS — E66.9 OBESITY, CLASS I, BMI 30-34.9: Primary | ICD-10-CM

## 2019-07-08 DIAGNOSIS — G47.33 OSA (OBSTRUCTIVE SLEEP APNEA): ICD-10-CM

## 2019-07-08 DIAGNOSIS — I10 ESSENTIAL HYPERTENSION: ICD-10-CM

## 2019-07-08 PROBLEM — Z71.82 EXERCISE COUNSELING: Status: RESOLVED | Noted: 2018-09-14 | Resolved: 2019-07-08

## 2019-07-08 PROCEDURE — 99213 OFFICE O/P EST LOW 20 MIN: CPT | Performed by: NURSE PRACTITIONER

## 2019-07-08 NOTE — PROGRESS NOTES
[facility] BARIATRIC SURGERY  [address] [facility] [address]  [facility] [address]  [phone]  [address] [facility] [address]  [facility] [id number] [phone]  Dept: [phone]  7/8/2019      Karen Alarcon.  [id number]  [id number]  1970  female      Chief Complaint   Patient presents with   • Follow-up     1 year sleeve       BH Post-Op Bariatric Surgery:   Karen Alarcon is status post Laparoscopic Sleeve procedure, performed on 6/11/18     HPI:   Today's weight is 91.6 kg (202 lb) pounds, today's BMI is Body mass index is 31.63 kg/m²., she has a  gain of 3 pounds since the last visit and her weight loss since surgery is 40 pounds. The patient reports a decreased portion size and loss of appetite.      Karen Alarcon denies GERD, dysphagia, nausea or vomiting.     Diet and Exercise: Diet history reviewed and discussed with the patient. Weight loss/gains to date discussed with the patient. The patient states they are eating 30 grams of protein per day. She reports eating 2 meals per day, a typical portion size of 1/2 cup, eating 4 snacks per day, drinking 5 or more 8-oz. glasses of water per day, no carbonated beverage consumption. Denies regular exercise.  Does report drinking sodas and eating sweets    Patient reports that she was drinking tea and had not been having soda but she got a kidney stone and is not allowed to have tea so has been drinking more soda the past week or so.  She reports that she has been eating more sugar and then craves something salty and then is back to something sweet.  Reports that her sister had surgery not long ago and is losing weight more quickly than she did in the beginning and she feels a little bit down on herself    Supplements: BA MTV with iron.     Review of Systems   Constitutional: Positive for appetite change. Negative for fatigue and unexpected weight change.   HENT: Negative.    Eyes: Negative.    Respiratory: Negative.     Cardiovascular: Negative.  Negative for leg swelling.   Gastrointestinal: Positive for constipation. Negative for abdominal distention, abdominal pain, diarrhea, nausea and vomiting.   Genitourinary: Negative for difficulty urinating, frequency and urgency.   Musculoskeletal: Negative for back pain.   Skin: Negative.    Psychiatric/Behavioral: Negative.    All other systems reviewed and are negative.      Patient Active Problem List   Diagnosis   • Chronic fatigue   • Essential hypertension   • DESIREE (obstructive sleep apnea)   • Hemorrhoids   • Urge incontinence   • Multiple joint pain   • History of seizure   • Depression   • Hereditary spherocytosis (CMS/HCC)   • History of DVT (deep vein thrombosis)   • History of splenectomy   • Obesity, Class I, BMI 30-34.9   • Dietary counseling       Past Medical History:   Diagnosis Date   • Anxiety and depression    • Blood disorder     hereditary spherocytosis   • Hereditary spherocytosis (CMS/HCC) 9/11/2017   • History of herpes genitalis    • Hypertension     OFF MEDS 2015   • Seizure disorder (CMS/HCC)     spherocytosis   • Sleep apnea    • Stress incontinence        The following portions of the patient's history were reviewed and updated as appropriate: allergies, current medications, past family history, past medical history, past social history, past surgical history and problem list.    Vitals:    07/08/19 1352   BP: 115/76   Pulse: 74   Resp: 18   Temp: 97.9 °F (36.6 °C)       Physical Exam   Constitutional: She appears well-developed and well-nourished.   Neck: No thyromegaly present.   Cardiovascular: Normal rate, regular rhythm and normal heart sounds.   Pulmonary/Chest: Effort normal and breath sounds normal. No respiratory distress. She has no wheezes.   Abdominal: Soft. Bowel sounds are normal. She exhibits no distension. There is no tenderness. There is no guarding. No hernia.   Musculoskeletal: She exhibits no edema or tenderness.   Neurological: She is  alert.   Skin: Skin is warm and dry. No rash noted. No erythema.   Psychiatric: She has a normal mood and affect. Her behavior is normal.   Nursing note and vitals reviewed.      Assessment:   Post-op, the patient is doing well.     Encounter Diagnoses   Name Primary?   • Obesity, Class I, BMI 30-34.9 Yes   • DESIREE (obstructive sleep apnea)    • Essential hypertension    • Depression, unspecified depression type        Plan:   Did discuss adding 2 doses of daily Metamucil to her routine to help prevent constipation and stabilize her blood sugars.  She was encouraged to start tracking her intake and eating more mindfully to avoid excess carbs as well as to cut out all liquid calories.  We discussed following up with the dietitian we will place her annual lab work today and will follow-up with patient as needed in 2 weeks with results.  We did discuss different apps that she may use to download and use to track her calorie and carb intake.  Patient was encouraged to avoid [employer] for breakfast as she has been having a sausage biscuit or sausage meat griddle every morning for breakfast.  We reviewed her daily carb intake goals  Encouraged patient to be sure to get plenty of lean protein per day through small frequent meals all with a protein source.   Activity restrictions: none.   Recommended patient be sure to get at least 70 grams of protein per day by eating small, frequent meals all with high lean protein choices. Be sure to limit/cut back on daily carbohydrate intake. Discussed with the patient the recommended amount of water per day to intake- half of body weight in ounces. Reviewed vitamin requirements. Be sure to do routine exercise, 150 minutes per week minimum, including both cardio and strength training.     Instructions / Recommendations: dietary counseling recommended, recommended a daily protein intake of  grams, vitamin supplement(s) recommended, recommended exercising at least 150 minutes per  week, behavior modifications recommended and instructed to call the office for concerns, questions, or problems.     The patient was instructed to follow up  6 months .      Total time spent face to face was 15 minutes and 20 minutes was spent counseling.

## 2020-02-18 ENCOUNTER — APPOINTMENT (OUTPATIENT)
Dept: WOMENS IMAGING | Facility: HOSPITAL | Age: 50
End: 2020-02-18

## 2020-02-18 PROCEDURE — 77063 BREAST TOMOSYNTHESIS BI: CPT | Performed by: RADIOLOGY

## 2020-02-18 PROCEDURE — 77067 SCR MAMMO BI INCL CAD: CPT | Performed by: RADIOLOGY

## 2020-03-10 ENCOUNTER — APPOINTMENT (OUTPATIENT)
Dept: WOMENS IMAGING | Facility: HOSPITAL | Age: 50
End: 2020-03-10

## 2020-03-10 PROCEDURE — 77065 DX MAMMO INCL CAD UNI: CPT | Performed by: RADIOLOGY

## 2020-03-10 PROCEDURE — 76641 ULTRASOUND BREAST COMPLETE: CPT | Performed by: RADIOLOGY

## 2020-03-10 PROCEDURE — 77061 BREAST TOMOSYNTHESIS UNI: CPT | Performed by: RADIOLOGY

## 2020-03-10 PROCEDURE — G0279 TOMOSYNTHESIS, MAMMO: HCPCS | Performed by: RADIOLOGY

## 2020-12-21 ENCOUNTER — APPOINTMENT (OUTPATIENT)
Dept: WOMENS IMAGING | Facility: HOSPITAL | Age: 50
End: 2020-12-21

## 2020-12-21 PROCEDURE — 77065 DX MAMMO INCL CAD UNI: CPT | Performed by: RADIOLOGY

## 2020-12-21 PROCEDURE — G0279 TOMOSYNTHESIS, MAMMO: HCPCS | Performed by: RADIOLOGY

## 2020-12-21 PROCEDURE — 77061 BREAST TOMOSYNTHESIS UNI: CPT | Performed by: RADIOLOGY

## 2021-03-26 ENCOUNTER — BULK ORDERING (OUTPATIENT)
Dept: CASE MANAGEMENT | Facility: OTHER | Age: 51
End: 2021-03-26

## 2021-03-26 DIAGNOSIS — Z23 IMMUNIZATION DUE: ICD-10-CM

## 2021-06-17 ENCOUNTER — PREP FOR SURGERY (OUTPATIENT)
Dept: SURGERY | Facility: SURGERY CENTER | Age: 51
End: 2021-06-17

## 2021-06-17 DIAGNOSIS — Z12.11 COLON CANCER SCREENING: Primary | ICD-10-CM

## 2021-06-22 PROBLEM — Z12.11 COLON CANCER SCREENING: Status: ACTIVE | Noted: 2021-06-22

## 2021-08-23 RX ORDER — DESVENLAFAXINE SUCCINATE 50 MG/1
150 TABLET, EXTENDED RELEASE ORAL DAILY
COMMUNITY

## 2021-08-25 ENCOUNTER — TELEPHONE (OUTPATIENT)
Dept: SURGERY | Facility: CLINIC | Age: 51
End: 2021-08-25

## 2021-11-17 ENCOUNTER — APPOINTMENT (OUTPATIENT)
Dept: WOMENS IMAGING | Facility: HOSPITAL | Age: 51
End: 2021-11-17

## 2021-11-17 PROCEDURE — 76641 ULTRASOUND BREAST COMPLETE: CPT | Performed by: RADIOLOGY

## 2021-11-17 PROCEDURE — G0279 TOMOSYNTHESIS, MAMMO: HCPCS | Performed by: RADIOLOGY

## 2021-11-17 PROCEDURE — 77066 DX MAMMO INCL CAD BI: CPT | Performed by: RADIOLOGY

## 2021-11-17 PROCEDURE — 77062 BREAST TOMOSYNTHESIS BI: CPT | Performed by: RADIOLOGY

## 2022-01-17 ENCOUNTER — ANESTHESIA (OUTPATIENT)
Dept: SURGERY | Facility: SURGERY CENTER | Age: 52
End: 2022-01-17

## 2022-01-17 ENCOUNTER — ANESTHESIA EVENT (OUTPATIENT)
Dept: SURGERY | Facility: SURGERY CENTER | Age: 52
End: 2022-01-17

## 2022-01-17 ENCOUNTER — HOSPITAL ENCOUNTER (OUTPATIENT)
Facility: SURGERY CENTER | Age: 52
Setting detail: HOSPITAL OUTPATIENT SURGERY
Discharge: HOME | End: 2022-01-17
Attending: SURGERY | Admitting: SURGERY

## 2022-01-17 VITALS
HEART RATE: 64 BPM | DIASTOLIC BLOOD PRESSURE: 78 MMHG | HEIGHT: 67 IN | TEMPERATURE: 96.8 F | OXYGEN SATURATION: 98 % | RESPIRATION RATE: 16 BRPM | SYSTOLIC BLOOD PRESSURE: 124 MMHG | BODY MASS INDEX: 34.5 KG/M2 | WEIGHT: 219.8 LBS

## 2022-01-17 PROCEDURE — 25010000002 PROPOFOL 10 MG/ML EMULSION: Performed by: NURSE ANESTHETIST, CERTIFIED REGISTERED

## 2022-01-17 PROCEDURE — S0260 H&P FOR SURGERY: HCPCS | Performed by: SURGERY

## 2022-01-17 PROCEDURE — 45378 DIAGNOSTIC COLONOSCOPY: CPT | Performed by: SURGERY

## 2022-01-17 PROCEDURE — 0DJD8ZZ INSPECTION OF LOWER INTESTINAL TRACT, VIA NATURAL OR ARTIFICIAL OPENING ENDOSCOPIC: ICD-10-PCS | Performed by: SURGERY

## 2022-01-17 RX ORDER — PROPOFOL 10 MG/ML
VIAL (ML) INTRAVENOUS AS NEEDED
Status: DISCONTINUED | OUTPATIENT
Start: 2022-01-17 | End: 2022-01-17 | Stop reason: SURG

## 2022-01-17 RX ORDER — SODIUM CHLORIDE, SODIUM LACTATE, POTASSIUM CHLORIDE, CALCIUM CHLORIDE 600; 310; 30; 20 MG/100ML; MG/100ML; MG/100ML; MG/100ML
1000 INJECTION, SOLUTION INTRAVENOUS CONTINUOUS
Status: DISCONTINUED | OUTPATIENT
Start: 2022-01-17 | End: 2022-01-17 | Stop reason: HOSPADM

## 2022-01-17 RX ORDER — SODIUM CHLORIDE 0.9 % (FLUSH) 0.9 %
10 SYRINGE (ML) INJECTION AS NEEDED
Status: DISCONTINUED | OUTPATIENT
Start: 2022-01-17 | End: 2022-01-17 | Stop reason: HOSPADM

## 2022-01-17 RX ORDER — LIDOCAINE HYDROCHLORIDE 10 MG/ML
0.5 INJECTION, SOLUTION INFILTRATION; PERINEURAL ONCE AS NEEDED
Status: DISCONTINUED | OUTPATIENT
Start: 2022-01-17 | End: 2022-01-17 | Stop reason: HOSPADM

## 2022-01-17 RX ORDER — LIDOCAINE HYDROCHLORIDE 20 MG/ML
INJECTION, SOLUTION INFILTRATION; PERINEURAL AS NEEDED
Status: DISCONTINUED | OUTPATIENT
Start: 2022-01-17 | End: 2022-01-17 | Stop reason: SURG

## 2022-01-17 RX ADMIN — PROPOFOL 100 MG: 10 INJECTION, EMULSION INTRAVENOUS at 10:21

## 2022-01-17 RX ADMIN — LIDOCAINE HYDROCHLORIDE 100 MG: 20 INJECTION, SOLUTION INFILTRATION; PERINEURAL at 10:21

## 2022-01-17 RX ADMIN — SODIUM CHLORIDE, POTASSIUM CHLORIDE, SODIUM LACTATE AND CALCIUM CHLORIDE 1000 ML: 600; 310; 30; 20 INJECTION, SOLUTION INTRAVENOUS at 09:04

## 2022-01-17 RX ADMIN — PROPOFOL 200 MCG/KG/MIN: 10 INJECTION, EMULSION INTRAVENOUS at 10:21

## 2022-01-17 NOTE — H&P
Date: 2022     Patient: Karen Alarcon    : 1970   [id number]     CC:  Screening Colonoscopy    History:   The patient is a 51 y.o. female of Anibal Callejas MD  who presents to discuss screening colonoscopy. The patient currently has no complaints.  Patient denies any history of nausea, abdominal pain, weight loss, change in bowel habits or rectal bleeding.  Patient denies melena, hematochezia or BRBPR.  No family history of ulcerative colitis, Crohn's disease, familial polyposis or colon cancer.    The following portions of the patient's history were reviewed and updated as appropriate: allergies, current medications, past family history, past medical history, past social history, past surgical history and problem list.    Past Medical History:   Diagnosis Date   • Anxiety and depression    • Blood disorder     hereditary spherocytosis   • Hereditary spherocytosis (HCC) 2017   • History of herpes genitalis    • Hypertension     OFF MEDS    • Seizure disorder (HCC)     spherocytosis   • Sleep apnea    • Stress incontinence       Past Surgical History:   Procedure Laterality Date   • CHOLECYSTECTOMY WITH INTRAOPERATIVE CHOLANGIOGRAM N/A 2016    Procedure: CHOLECYSTECTOMY LAPAROSCOPIC INTRAOPERATIVE CHOLANGIOGRAM;  Surgeon: Lázaro Leija MD;  Location: [facility];  Service:    • GASTRIC SLEEVE LAPAROSCOPIC N/A 2018    Procedure: GASTRIC SLEEVE LAPAROSCOPIC;  Surgeon: Mick Posadas Jr., MD;  Location: [facility];  Service: Bariatric   • HYSTERECTOMY  2008    partial   • KNEE ACL RECONSTRUCTION Bilateral ,    • SINUS SURGERY     • SPLENECTOMY     • TONSILLECTOMY       Medications:   Medications Prior to Admission   Medication Sig Dispense Refill Last Dose   • desvenlafaxine (PRISTIQ) 50 MG 24 hr tablet Take 150 mg by mouth Daily.   2022 at Unknown time   • LamoTRIgine  MG tablet sustained-release 24 hour    2022 at Unknown time    • Multiple Vitamins-Minerals (MULTIVITAMIN ADULT PO) Take 1 tablet by mouth Daily.   1/16/2022 at Unknown time     Allergies: Adhesive tape   Social History:  Social History     Socioeconomic History   • Marital status:      Spouse name: Ricki Alarcon   • Number of children: 2   Tobacco Use   • Smoking status: Never Smoker   • Smokeless tobacco: Never Used   Vaping Use   • Vaping Use: Never used   Substance and Sexual Activity   • Alcohol use: No   • Drug use: No   • Sexual activity: Defer      Family History   Problem Relation Age of Onset   • Bleeding Disorder Mother    • Cancer Father    • Malig Hyperthermia Neg Hx         Review of Systems:   General: Patient reports good health  Eyes: No eye problems  Ears, nose, mouth and throat: No rhinitis, no hearing problems, no chronic cough  Cardiovascular/heart: Denies palpitations, syncope or chest pain  Respiratory/lung: Denies shortness of breath, hemoptysis, or dyspnea on exertion   Genital/urinary: No frequency, hematuria or dysuria  Hematological/lymphatic: Denies anemia or other problems  Musculoskeletal: No joint pain, no defects  Skin: No psoriasis or other skin issues  Neurological: No seizures or other neurological problems  Psychiatric: None  Endocrine: Negative  Gastro-intestinal: No constipation, no diarrhea, no melena, no hematochezia    Physical Examination:  General: Alert and oriented x3 in no acute distress  HEENT: Normal cephalic, atraumatic, PERRLA, EOMI, sclera anicteric, moist mucous membranes, neck is supple, no JVD, no carotid bruits, no thyromegaly no adenopathy  Chest: CTA and percussion  CVA: RRR, normal S1-S2, no murmurs, no gallops or rubs  Abdomen: Positive BS, soft, nondistended, nontender, no rebound, no guarding, no hernias, no organomegaly and no masses  Extremities: Full range of motion, no clubbing, no cyanosis or edema  Neurovascular: Grossly intact  Debilities: none  Emotional behavior: appropriate     Vitals:    08/23/21  "1005 01/13/22 1015 01/17/22 0908   BP:   129/85   BP Location:   Left arm   Patient Position:   Lying   Pulse:   65   Resp:   16   Temp:   96.8 °F (36 °C)   TempSrc:   Temporal   SpO2:   96%   Weight: 99.8 kg (220 lb) 99.3 kg (219 lb) 99.7 kg (219 lb 12.8 oz)   Height: 170.2 cm (67\") 170.2 cm (67\")      Impression:  51 y.o. female for screening colonoscopy.    Plan:  Patient is presenting for screening colonoscopy.  I have recommended that the patient undergo a screening colonoscopy in accordance of American Cancer Society's guidelines.  I have discussed this procedure in detail with the patient.  I have discussed the risks, benefits and alternatives.  I have discussed the risk of anesthesia, bleeding and perforation.  Patient understands these risks, benefits and alternatives and wishes to proceed.      Odalys Mata MD  General, Minimally Invasive and Endoscopic Surgery  [facility]    [address]   [address]    [address]  [address] [address]    P: [phone]  F: [phone]    Cc:  Anibal Callejas MD  "

## 2022-01-17 NOTE — ANESTHESIA PREPROCEDURE EVALUATION
Anesthesia Evaluation     no history of anesthetic complications:  NPO Solid Status: > 8 hours  NPO Liquid Status: > 2 hours           Airway   Mallampati: II  Neck ROM: full  no difficulty expected  Dental - normal exam     Pulmonary - normal exam   (+) sleep apnea,   (-) COPD, asthma, not a smoker    PE comment: nonlabored  Cardiovascular - normal exam    Rhythm: regular  Rate: normal    (+) hypertension, DVT,   (-) valvular problems/murmurs, past MI, CAD, dysrhythmias, angina      Neuro/Psych  (+) seizures (Tonic-clonic type; none since 2010), psychiatric history Anxiety and Depression,     (-) TIA, CVA  GI/Hepatic/Renal/Endo    (+) obesity,     (-) GERD, liver disease, no renal disease, diabetes, no thyroid disorder    ROS Comment: S/p splenectomy;  S/p gastric sleeve    Musculoskeletal     (+) arthralgias,   Abdominal    Substance History      OB/GYN          Other   blood dyscrasia (hereditary spherocytosis),                     Anesthesia Plan    ASA 3     MAC       Anesthetic plan, all risks, benefits, and alternatives have been provided, discussed and informed consent has been obtained with: patient.

## 2022-01-17 NOTE — ANESTHESIA POSTPROCEDURE EVALUATION
"Patient: Karen Alarcon    Procedure Summary     Date: 01/17/22 Room / Location: [facility]  / [facility]    Anesthesia Start: 1016 Anesthesia Stop: 1051    Procedure: COLONOSCOPY (N/A ) Diagnosis:       Colon cancer screening      (Colon cancer screening [Z12.11])    Surgeons: Odalys Mata MD Provider: Hema Us MD    Anesthesia Type: MAC ASA Status: 3          Anesthesia Type: MAC    Vitals  Vitals Value Taken Time   /72 01/17/22 1048   Temp 36 °C (96.8 °F) 01/17/22 1048   Pulse 76 01/17/22 1048   Resp 16 01/17/22 1048   SpO2 96 % 01/17/22 1048           Post Anesthesia Care and Evaluation    Patient location during evaluation: bedside  Patient participation: complete - patient participated  Level of consciousness: awake  Pain management: adequate  Airway patency: patent  Anesthetic complications: No anesthetic complications    Cardiovascular status: acceptable  Respiratory status: acceptable  Hydration status: acceptable    Comments: */72 (BP Location: Left arm, Patient Position: Lying)   Pulse 76   Temp 36 °C (96.8 °F) (Infrared)   Resp 16   Ht 170.2 cm (67\")   Wt 99.7 kg (219 lb 12.8 oz)   SpO2 96%   BMI 34.43 kg/m²         "

## 2022-01-17 NOTE — OP NOTE
Operative Note:    Pre-op dx: Screening Colonoscopy    Post-op dx: normal colon    Procedure: Colonoscopy    Surgeon: Odalys Mata MD    Anesthesia: MAC    EBL: none    Specimen:  * No orders in the log *    Complications: none    Procedure:    Colonoscopy:  A digital rectal examination was performed which revealed no rectal masses.  Scope was introduced into the rectum and advanced into the sigmoid, descending colon, splenic flexure, transverse colon, hepatic flexure, ascending colon and into the cecum.  Cecum was identified by the ileocecal valve and appendiceal orifice.  There was no evidence of any diverticulosis, polyps, masses, AV malformation or inflammation.  The bowel preparation was excellent. The scope was slowly withdrawn and a second look was performed.  Upon the second look, there were no new findings.  The colon was desufflated and the procedure was terminated.   Patient was transferred to recovery room in stable condition.      Assessment/Plan:  Repeat colonoscopy in 10 years.  Normal colon    Odalys Mata MD  General, Minimally Invasive and Endoscopic Surgery  [facility]    [address] [facility] [address] [place] [address]   [address]    [address]  [place], [place] [address] [place] [id number]    P: [phone]  F: [phone]    Cc:  Anibal Callejas MD

## 2024-07-22 ENCOUNTER — PREP FOR SURGERY (OUTPATIENT)
Dept: OTHER | Facility: HOSPITAL | Age: 54
End: 2024-07-22
Payer: COMMERCIAL

## 2024-07-22 DIAGNOSIS — Z12.11 SCREENING FOR COLON CANCER: Primary | ICD-10-CM

## 2024-09-17 PROBLEM — Z12.11 SCREENING FOR COLON CANCER: Status: ACTIVE | Noted: 2024-07-22

## 2024-12-06 RX ORDER — ESTRADIOL 0.05 MG/D
1 PATCH, EXTENDED RELEASE TRANSDERMAL 2 TIMES WEEKLY
COMMUNITY
Start: 2024-05-02

## 2024-12-09 ENCOUNTER — HOSPITAL ENCOUNTER (OUTPATIENT)
Facility: HOSPITAL | Age: 54
Setting detail: HOSPITAL OUTPATIENT SURGERY
Discharge: HOME | End: 2024-12-09
Attending: STUDENT IN AN ORGANIZED HEALTH CARE EDUCATION/TRAINING PROGRAM | Admitting: STUDENT IN AN ORGANIZED HEALTH CARE EDUCATION/TRAINING PROGRAM
Payer: COMMERCIAL

## 2024-12-09 ENCOUNTER — ANESTHESIA EVENT (OUTPATIENT)
Dept: GASTROENTEROLOGY | Facility: HOSPITAL | Age: 54
End: 2024-12-09
Payer: COMMERCIAL

## 2024-12-09 ENCOUNTER — ANESTHESIA (OUTPATIENT)
Dept: GASTROENTEROLOGY | Facility: HOSPITAL | Age: 54
End: 2024-12-09
Payer: COMMERCIAL

## 2024-12-09 VITALS
WEIGHT: 162 LBS | SYSTOLIC BLOOD PRESSURE: 125 MMHG | BODY MASS INDEX: 25.43 KG/M2 | HEART RATE: 64 BPM | OXYGEN SATURATION: 99 % | RESPIRATION RATE: 16 BRPM | DIASTOLIC BLOOD PRESSURE: 78 MMHG | HEIGHT: 67 IN

## 2024-12-09 DIAGNOSIS — Z12.11 SCREENING FOR COLON CANCER: ICD-10-CM

## 2024-12-09 PROCEDURE — 25010000002 LIDOCAINE 2% SOLUTION: Performed by: STUDENT IN AN ORGANIZED HEALTH CARE EDUCATION/TRAINING PROGRAM

## 2024-12-09 PROCEDURE — 25810000003 LACTATED RINGERS PER 1000 ML: Performed by: STUDENT IN AN ORGANIZED HEALTH CARE EDUCATION/TRAINING PROGRAM

## 2024-12-09 PROCEDURE — 45380 COLONOSCOPY AND BIOPSY: CPT | Performed by: STUDENT IN AN ORGANIZED HEALTH CARE EDUCATION/TRAINING PROGRAM

## 2024-12-09 PROCEDURE — 88305 TISSUE EXAM BY PATHOLOGIST: CPT | Performed by: STUDENT IN AN ORGANIZED HEALTH CARE EDUCATION/TRAINING PROGRAM

## 2024-12-09 PROCEDURE — 25010000002 PROPOFOL 10 MG/ML EMULSION: Performed by: STUDENT IN AN ORGANIZED HEALTH CARE EDUCATION/TRAINING PROGRAM

## 2024-12-09 RX ORDER — PROPOFOL 10 MG/ML
VIAL (ML) INTRAVENOUS AS NEEDED
Status: DISCONTINUED | OUTPATIENT
Start: 2024-12-09 | End: 2024-12-09 | Stop reason: SURG

## 2024-12-09 RX ORDER — LIDOCAINE HYDROCHLORIDE 20 MG/ML
INJECTION, SOLUTION INFILTRATION; PERINEURAL AS NEEDED
Status: DISCONTINUED | OUTPATIENT
Start: 2024-12-09 | End: 2024-12-09 | Stop reason: SURG

## 2024-12-09 RX ORDER — SODIUM CHLORIDE, SODIUM LACTATE, POTASSIUM CHLORIDE, CALCIUM CHLORIDE 600; 310; 30; 20 MG/100ML; MG/100ML; MG/100ML; MG/100ML
1000 INJECTION, SOLUTION INTRAVENOUS CONTINUOUS
Status: DISCONTINUED | OUTPATIENT
Start: 2024-12-09 | End: 2024-12-09 | Stop reason: HOSPADM

## 2024-12-09 RX ORDER — SEMAGLUTIDE 2.4 MG/.75ML
INJECTION, SOLUTION SUBCUTANEOUS WEEKLY
COMMUNITY

## 2024-12-09 RX ORDER — SODIUM CHLORIDE 0.9 % (FLUSH) 0.9 %
10 SYRINGE (ML) INJECTION AS NEEDED
Status: DISCONTINUED | OUTPATIENT
Start: 2024-12-09 | End: 2024-12-09 | Stop reason: HOSPADM

## 2024-12-09 RX ADMIN — LIDOCAINE HYDROCHLORIDE 60 MG: 20 INJECTION, SOLUTION INFILTRATION; PERINEURAL at 12:41

## 2024-12-09 RX ADMIN — PROPOFOL 120 MG: 10 INJECTION, EMULSION INTRAVENOUS at 12:41

## 2024-12-09 RX ADMIN — PROPOFOL 160 MCG/KG/MIN: 10 INJECTION, EMULSION INTRAVENOUS at 12:41

## 2024-12-09 RX ADMIN — SODIUM CHLORIDE, POTASSIUM CHLORIDE, SODIUM LACTATE AND CALCIUM CHLORIDE 1000 ML: 600; 310; 30; 20 INJECTION, SOLUTION INTRAVENOUS at 11:59

## 2024-12-09 NOTE — ANESTHESIA PREPROCEDURE EVALUATION
Anesthesia Evaluation     no history of anesthetic complications:   NPO Solid Status: > 8 hours  NPO Liquid Status: > 2 hours           Airway   Mallampati: II  TM distance: >3 FB  Neck ROM: full  Dental - normal exam     Pulmonary    (+) ,sleep apnea  (-) COPD, asthma, not a smoker    PE comment: nonlabored  Cardiovascular     (+) hypertension, DVT  (-) valvular problems/murmurs, past MI, CAD, dysrhythmias, angina      Neuro/Psych  (+) seizures (Tonic-clonic type; none since 2010), psychiatric history Anxiety and Depression  (-) TIA, CVA  GI/Hepatic/Renal/Endo    (+) obesity  (-) GERD, liver disease, no renal disease, diabetes, no thyroid disorder    ROS Comment: S/p splenectomy;  S/p gastric sleeve    Musculoskeletal     (+) arthralgias  Abdominal    Substance History      OB/GYN          Other   blood dyscrasia (hereditary spherocytosis),                       Anesthesia Plan    ASA 3     MAC   total IV anesthesia  (I have reviewed the patient's history with the patient and the chart, including all pertinent laboratory results and imaging. I have explained the risks of anesthesia including but not limited to dental damage, corneal abrasion, nerve injury, MI, stroke, and death. Questions asked and answered. Anesthetic plan discussed with patient and team as indicated. Patient expressed understanding of the above.  )    Anesthetic plan, risks, benefits, and alternatives have been provided, discussed and informed consent has been obtained with: patient.

## 2024-12-09 NOTE — ANESTHESIA POSTPROCEDURE EVALUATION
"Patient: Karen Alarcon    Procedure Summary       Date: 12/09/24 Room / Location: [facility] ENDOSCOPY 4 /  [facility]    Anesthesia Start: 1238 Anesthesia Stop: 1308    Procedure: COLONOSCOPY to cecum to TI with cold forcep polypectomy Diagnosis:       Screening for colon cancer      (Screening for colon cancer [Z12.11])    Surgeons: Lindsay Blandon MD Provider: Maurice Wilson MD    Anesthesia Type: MAC ASA Status: 3            Anesthesia Type: MAC    Vitals  Vitals Value Taken Time   /73 12/09/24 1304   Temp     Pulse 69 12/09/24 1308   Resp 16 12/09/24 1304   SpO2 100 % 12/09/24 1308   Vitals shown include unfiled device data.        Post Anesthesia Care and Evaluation    Patient location during evaluation: PACU  Patient participation: complete - patient participated  Level of consciousness: awake and alert  Pain management: adequate    Airway patency: patent  Anesthetic complications: No anesthetic complications  PONV Status: controlled  Cardiovascular status: acceptable and hemodynamically stable  Respiratory status: acceptable  Hydration status: acceptable    Comments: /73 (BP Location: Left arm, Patient Position: Lying)   Pulse 68   Resp 16   Ht 170.2 cm (67\")   Wt 73.5 kg (162 lb)   SpO2 100%   BMI 25.37 kg/m²     "

## 2024-12-09 NOTE — H&P
GENERAL SURGERY HISTORY AND PHYSICAL     Summary:    Mrs. Karen Alarcon is a 54 y.o. lady here for screening colonoscopy.     Chief Complaint:    Screening colonoscopy     History of Present Illness:    Mrs. Karen Alarcon is a 54 y.o. lady here for screening colonoscopy. Last colonoscopy 20 years ago.    Denies bleeding, rectal pain, weight loss.     Denies family history of colon cancer.     Allergies:   Allergies   Allergen Reactions    Adhesive Tape Rash     Medications:    Reviewed in Epic     Physical Exam:   Constitutional: Well-developed well-nourished, no acute distress  Eyes: Conjunctiva normal, sclera nonicteric  ENMT: Hearing grossly normal, oral mucosa moist  Neck: Supple, trachea midline  Respiratory: No increased work of breathing, normal inspiratory effort  Cardiovascular: Regular rate, no peripheral edema, no jugular venous distention  Gastrointestinal: Soft, nontender  Skin:  Warm, dry, no rash on visualized skin surfaces  Musculoskeletal: Symmetric strength, normal gait  Psychiatric: Alert and oriented ×3, normal affect   BMI cannot be calculated due to outdated height or weight values.  Please input a current height/weight in Vitals and re-renter BMIFOLLOWUP in Note to pull in correct documentation based on BMI range.    Lindsay Blandon M.D.  General and Endoscopic Surgery  [facility], [place], operated by [facility]    [address], [address]  [place], [place], [address]  P: [phone]  F: [phone]

## 2024-12-09 NOTE — DISCHARGE INSTRUCTIONS
For the next 24 hours patient needs to be with a responsible adult.    For 24 hours DO NOT drive, operate machinery, appliances, drink alcohol, make important decisions or sign legal documents.    Start with a light or bland diet if you are feeling sick to your stomach otherwise advance to regular diet as tolerated.    Follow recommendations on procedure report if provided by your doctor.    Call Dr Blandon for problems [phone]    Problems may include but not limited to: large amounts of bleeding, trouble breathing, repeated vomiting, severe unrelieved pain, fever or chills.

## 2024-12-09 NOTE — OP NOTE
PREOPERATIVE DIAGNOSIS:  Screening    POSTOPERATIVE DIAGNOSIS AND FINDINGS:  Transverse colon polyp    PROCEDURE:  Colonoscopy to cecum and terminal ileum with cold biopsy forceps of transverse colon.    SURGEON:  Lindsay Arzate MD    ANESTHESIA:  MAC    SPECIMEN(S):  Transverse colon polyp     DESCRIPTION:  In the decubitus position, a digital rectal exam was performed and was normal. The colonoscope was inserted under direct visualization of the lumen to the cecum, which was confirmed by visualization of the ileocecal valve and appendiceal orifice. The scope was then slowly withdrawn circumferentially examining all mucosal surfaces.  There is a small less than 1 cm transverse colon polyp that was completely removed with cold biopsy forceps.    The bowel prep was good.     The patient tolerated the procedure well.     RECOMMENDATION FOR FUTURE SURVEILLANCE:  To be determined based on polyp pathology and issued as separate report    LINDSAY ARZATE M.D.  General and Endoscopic Surgery  [facility]    [address], [address]  [place], [place], [address]  P: [phone]  F: [phone]

## 2024-12-10 LAB
CYTO UR: NORMAL
LAB AP CASE REPORT: NORMAL
PATH REPORT.FINAL DX SPEC: NORMAL
PATH REPORT.GROSS SPEC: NORMAL

## 2024-12-11 ENCOUNTER — TELEPHONE (OUTPATIENT)
Dept: SURGERY | Facility: CLINIC | Age: 54
End: 2024-12-11

## 2024-12-11 NOTE — TELEPHONE ENCOUNTER
Cld & informed pt of cscope results.  Pt verb understanding to all.  Placed 5yr recall & updated the HM tab   
(3) no apparent problem

## (undated) DEVICE — KT ORCA ORCAPOD DISP STRL

## (undated) DEVICE — APL DUPLOSPRAYER MIS 40CM

## (undated) DEVICE — SINGLE-USE BIOPSY FORCEPS: Brand: RADIAL JAW 4

## (undated) DEVICE — ADAPT CLN BIOGUARD AIR/H2O DISP

## (undated) DEVICE — GLV SURG SENSICARE MICRO PF LF 6 STRL

## (undated) DEVICE — ENDOPATH XCEL WITH OPTIVIEW TECHNOLOGY BLADELESS TROCARS WITH STABILITY SLEEVES: Brand: ENDOPATH XCEL OPTIVIEW

## (undated) DEVICE — GOWN ISOL W/THUMB UNIV BLU BX/15

## (undated) DEVICE — GLV SURG SENSICARE GREEN W/ALOE PF LF 8.5 STRL

## (undated) DEVICE — SENSR O2 OXIMAX FNGR A/ 18IN NONSTR

## (undated) DEVICE — FLEX ADVANTAGE 1500CC: Brand: FLEX ADVANTAGE

## (undated) DEVICE — CANN NASL CO2 TRULINK W/O2 A/

## (undated) DEVICE — ENSEAL LAPAROSCOPIC TISSUE SEALER G2 ARTICULATING CURVED JAW FOR USE WITH G2 GENERATOR 5MM DIAMETER 45CM SHAFT LENGTH: Brand: ENSEAL

## (undated) DEVICE — LN SMPL CO2 SHTRM SD STREAM W/M LUER

## (undated) DEVICE — CANN O2 ETCO2 FITS ALL CONN CO2 SMPL A/ 7IN DISP LF

## (undated) DEVICE — GLV SURG SENSICARE MICRO PF LF 8.5 STRL

## (undated) DEVICE — TUBING, SUCTION, 1/4" X 10', STRAIGHT: Brand: MEDLINE

## (undated) DEVICE — Device

## (undated) DEVICE — GLV SURG SENSICARE GREEN W/ALOE PF LF 6 STRL

## (undated) DEVICE — VISIGI 3D®  CALIBRATION SYSTEM  SIZE 36FR STD W/ BULB: Brand: BOEHRINGER® VISIGI 3D™ SLEEVE GASTRECTOMY CALIBRATION SYSTEM, SIZE 36FR W/BULB

## (undated) DEVICE — PK OSC LAP SLV 40

## (undated) DEVICE — ADHS SKIN DERMABOND